# Patient Record
Sex: MALE | Race: WHITE | NOT HISPANIC OR LATINO | Employment: OTHER | URBAN - METROPOLITAN AREA
[De-identification: names, ages, dates, MRNs, and addresses within clinical notes are randomized per-mention and may not be internally consistent; named-entity substitution may affect disease eponyms.]

---

## 2019-10-22 ENCOUNTER — HOSPITAL ENCOUNTER (OUTPATIENT)
Dept: GENERAL RADIOLOGY | Facility: HOSPITAL | Age: 44
Discharge: HOME | End: 2019-10-22

## 2019-10-22 ENCOUNTER — CONSULT (OUTPATIENT)
Dept: BARIATRICS/WEIGHT MGMT | Facility: CLINIC | Age: 44
End: 2019-10-22

## 2019-10-22 ENCOUNTER — TRANSCRIBE ORDERS (OUTPATIENT)
Dept: CARDIOLOGY | Facility: HOSPITAL | Age: 44
End: 2019-10-22

## 2019-10-22 ENCOUNTER — HOSPITAL ENCOUNTER (OUTPATIENT)
Dept: CARDIOLOGY | Facility: HOSPITAL | Age: 44
Discharge: HOME | End: 2019-10-22
Admitting: NURSE PRACTITIONER

## 2019-10-22 ENCOUNTER — LAB (OUTPATIENT)
Dept: LAB | Facility: HOSPITAL | Age: 44
End: 2019-10-22

## 2019-10-22 VITALS
BODY MASS INDEX: 45.1 KG/M2 | DIASTOLIC BLOOD PRESSURE: 93 MMHG | WEIGHT: 315 LBS | RESPIRATION RATE: 18 BRPM | TEMPERATURE: 97.5 F | SYSTOLIC BLOOD PRESSURE: 152 MMHG | HEART RATE: 71 BPM | HEIGHT: 70 IN

## 2019-10-22 DIAGNOSIS — E03.9 HYPOTHYROIDISM, UNSPECIFIED TYPE: ICD-10-CM

## 2019-10-22 DIAGNOSIS — E66.01 CLASS 3 SEVERE OBESITY WITH SERIOUS COMORBIDITY AND BODY MASS INDEX (BMI) OF 45.0 TO 49.9 IN ADULT, UNSPECIFIED OBESITY TYPE (HCC): Primary | ICD-10-CM

## 2019-10-22 DIAGNOSIS — F31.9 BIPOLAR 1 DISORDER (HCC): ICD-10-CM

## 2019-10-22 DIAGNOSIS — R53.82 CHRONIC FATIGUE: ICD-10-CM

## 2019-10-22 DIAGNOSIS — E66.01 CLASS 3 SEVERE OBESITY WITH SERIOUS COMORBIDITY AND BODY MASS INDEX (BMI) OF 45.0 TO 49.9 IN ADULT, UNSPECIFIED OBESITY TYPE (HCC): ICD-10-CM

## 2019-10-22 DIAGNOSIS — E78.5 HYPERLIPIDEMIA, UNSPECIFIED HYPERLIPIDEMIA TYPE: ICD-10-CM

## 2019-10-22 DIAGNOSIS — Z01.811 PRE-OP CHEST EXAM: Primary | ICD-10-CM

## 2019-10-22 DIAGNOSIS — K21.9 GASTROESOPHAGEAL REFLUX DISEASE, ESOPHAGITIS PRESENCE NOT SPECIFIED: ICD-10-CM

## 2019-10-22 DIAGNOSIS — I10 ESSENTIAL HYPERTENSION: ICD-10-CM

## 2019-10-22 DIAGNOSIS — G47.33 OBSTRUCTIVE SLEEP APNEA SYNDROME: ICD-10-CM

## 2019-10-22 DIAGNOSIS — F41.8 DEPRESSION WITH ANXIETY: ICD-10-CM

## 2019-10-22 PROBLEM — E66.813 CLASS 3 SEVERE OBESITY WITH BODY MASS INDEX (BMI) OF 45.0 TO 49.9 IN ADULT: Status: ACTIVE | Noted: 2019-10-22

## 2019-10-22 PROBLEM — G47.30 SLEEP APNEA: Status: ACTIVE | Noted: 2019-10-22

## 2019-10-22 PROBLEM — G47.00 INSOMNIA: Status: ACTIVE | Noted: 2019-10-22

## 2019-10-22 LAB
ALBUMIN SERPL-MCNC: 4.4 G/DL (ref 3.5–5.2)
ALBUMIN/GLOB SERPL: 1.7 G/DL
ALP SERPL-CCNC: 78 U/L (ref 39–117)
ALT SERPL W P-5'-P-CCNC: 32 U/L (ref 1–41)
ANION GAP SERPL CALCULATED.3IONS-SCNC: 9.7 MMOL/L (ref 5–15)
AST SERPL-CCNC: 19 U/L (ref 1–40)
BASOPHILS # BLD AUTO: 0.06 10*3/MM3 (ref 0–0.2)
BASOPHILS NFR BLD AUTO: 1.1 % (ref 0–1.5)
BILIRUB SERPL-MCNC: 0.3 MG/DL (ref 0.2–1.2)
BUN BLD-MCNC: 10 MG/DL (ref 6–20)
BUN/CREAT SERPL: 11.4 (ref 7–25)
CALCIUM SPEC-SCNC: 9.1 MG/DL (ref 8.6–10.5)
CHLORIDE SERPL-SCNC: 107 MMOL/L (ref 98–107)
CHOLEST SERPL-MCNC: 133 MG/DL (ref 0–200)
CO2 SERPL-SCNC: 27.3 MMOL/L (ref 22–29)
CREAT BLD-MCNC: 0.88 MG/DL (ref 0.76–1.27)
DEPRECATED RDW RBC AUTO: 43.5 FL (ref 37–54)
EOSINOPHIL # BLD AUTO: 0.3 10*3/MM3 (ref 0–0.4)
EOSINOPHIL NFR BLD AUTO: 5.6 % (ref 0.3–6.2)
ERYTHROCYTE [DISTWIDTH] IN BLOOD BY AUTOMATED COUNT: 13.4 % (ref 12.3–15.4)
GFR SERPL CREATININE-BSD FRML MDRD: 95 ML/MIN/1.73
GLOBULIN UR ELPH-MCNC: 2.6 GM/DL
GLUCOSE BLD-MCNC: 91 MG/DL (ref 65–99)
HBA1C MFR BLD: 5.4 % (ref 4.8–5.6)
HCT VFR BLD AUTO: 41 % (ref 37.5–51)
HDLC SERPL-MCNC: 34 MG/DL (ref 40–60)
HGB BLD-MCNC: 13.9 G/DL (ref 13–17.7)
IMM GRANULOCYTES # BLD AUTO: 0.02 10*3/MM3 (ref 0–0.05)
IMM GRANULOCYTES NFR BLD AUTO: 0.4 % (ref 0–0.5)
LDLC SERPL CALC-MCNC: 50 MG/DL (ref 0–100)
LDLC/HDLC SERPL: 1.46 {RATIO}
LYMPHOCYTES # BLD AUTO: 1.91 10*3/MM3 (ref 0.7–3.1)
LYMPHOCYTES NFR BLD AUTO: 35.9 % (ref 19.6–45.3)
MCH RBC QN AUTO: 30 PG (ref 26.6–33)
MCHC RBC AUTO-ENTMCNC: 33.9 G/DL (ref 31.5–35.7)
MCV RBC AUTO: 88.4 FL (ref 79–97)
MONOCYTES # BLD AUTO: 0.46 10*3/MM3 (ref 0.1–0.9)
MONOCYTES NFR BLD AUTO: 8.6 % (ref 5–12)
NEUTROPHILS # BLD AUTO: 2.57 10*3/MM3 (ref 1.7–7)
NEUTROPHILS NFR BLD AUTO: 48.4 % (ref 42.7–76)
NRBC BLD AUTO-RTO: 0 /100 WBC (ref 0–0.2)
PLATELET # BLD AUTO: 209 10*3/MM3 (ref 140–450)
PMV BLD AUTO: 9.4 FL (ref 6–12)
POTASSIUM BLD-SCNC: 4.5 MMOL/L (ref 3.5–5.2)
PROT SERPL-MCNC: 7 G/DL (ref 6–8.5)
RBC # BLD AUTO: 4.64 10*6/MM3 (ref 4.14–5.8)
SODIUM BLD-SCNC: 144 MMOL/L (ref 136–145)
TRIGL SERPL-MCNC: 247 MG/DL (ref 0–150)
TSH SERPL DL<=0.05 MIU/L-ACNC: 0.78 UIU/ML (ref 0.27–4.2)
VLDLC SERPL-MCNC: 49.4 MG/DL (ref 5–40)
WBC NRBC COR # BLD: 5.32 10*3/MM3 (ref 3.4–10.8)

## 2019-10-22 PROCEDURE — 93010 ELECTROCARDIOGRAM REPORT: CPT | Performed by: INTERNAL MEDICINE

## 2019-10-22 PROCEDURE — 84443 ASSAY THYROID STIM HORMONE: CPT

## 2019-10-22 PROCEDURE — 85025 COMPLETE CBC W/AUTO DIFF WBC: CPT

## 2019-10-22 PROCEDURE — 93005 ELECTROCARDIOGRAM TRACING: CPT

## 2019-10-22 PROCEDURE — 36415 COLL VENOUS BLD VENIPUNCTURE: CPT

## 2019-10-22 PROCEDURE — 80053 COMPREHEN METABOLIC PANEL: CPT

## 2019-10-22 PROCEDURE — 71046 X-RAY EXAM CHEST 2 VIEWS: CPT

## 2019-10-22 PROCEDURE — 99205 OFFICE O/P NEW HI 60 MIN: CPT | Performed by: NURSE PRACTITIONER

## 2019-10-22 PROCEDURE — 83036 HEMOGLOBIN GLYCOSYLATED A1C: CPT

## 2019-10-22 PROCEDURE — 80061 LIPID PANEL: CPT

## 2019-10-22 RX ORDER — ARIPIPRAZOLE 30 MG/1
30 TABLET ORAL DAILY
COMMUNITY
Start: 2014-07-09 | End: 2022-11-04

## 2019-10-22 RX ORDER — CARBAMAZEPINE 200 MG/1
100 TABLET ORAL
COMMUNITY

## 2019-10-22 RX ORDER — ESCITALOPRAM OXALATE 20 MG/1
20 TABLET ORAL DAILY
COMMUNITY
End: 2022-11-04

## 2019-10-22 RX ORDER — HYDROCODONE BITARTRATE AND ACETAMINOPHEN 7.5; 325 MG/1; MG/1
1 TABLET ORAL
COMMUNITY
Start: 2019-08-28 | End: 2020-01-13

## 2019-10-22 RX ORDER — LOSARTAN POTASSIUM 50 MG/1
50 TABLET ORAL DAILY
COMMUNITY
End: 2022-11-04

## 2019-10-22 RX ORDER — OFLOXACIN 3 MG/ML
5 SOLUTION AURICULAR (OTIC)
COMMUNITY
Start: 2019-08-28 | End: 2019-10-22

## 2019-10-22 RX ORDER — PANTOPRAZOLE SODIUM 40 MG/1
40 GRANULE, DELAYED RELEASE ORAL DAILY
Status: ON HOLD | COMMUNITY
End: 2022-11-09

## 2019-10-22 RX ORDER — LEVOTHYROXINE SODIUM 0.05 MG/1
50 TABLET ORAL DAILY
COMMUNITY

## 2019-10-22 RX ORDER — MIRTAZAPINE 30 MG/1
30 TABLET, FILM COATED ORAL DAILY
COMMUNITY
End: 2022-11-04

## 2019-10-22 RX ORDER — OLANZAPINE 5 MG/1
5 TABLET ORAL DAILY
COMMUNITY
End: 2022-11-04

## 2019-10-22 RX ORDER — ATORVASTATIN CALCIUM 10 MG/1
10 TABLET, FILM COATED ORAL DAILY
COMMUNITY
End: 2022-11-04

## 2019-10-22 NOTE — PROGRESS NOTES
"Bariatric Nutrition Counseling Interview    Patient Name:  Vikash Mccarty  YOB: 1975  Age:  44 y.o.  Sex:  male  MRN: [id number]  Date:  10/22/19    Procedure Considering:  Sleeve    Last Documented Height:    Ht Readings from Last 1 Encounters:   10/22/19 177.7 cm (69.96\")     Last Documented Weight:   Wt Readings from Last 1 Encounters:   10/22/19 (!) 154 kg (338 lb 8 oz)      Body mass index is 48.62 kg/m².    Highest Weight:  350  Goal Weight: 200    History:  History reviewed. No pertinent past medical history.  Past Surgical History:   Procedure Laterality Date   • COLONOSCOPY  2019   • TYMPANOSTOMY TUBE PLACEMENT  2019     Family History   Problem Relation Age of Onset   • Hypertension Mother    • Stroke Mother    • Sleep apnea Mother    • Hypertension Father    • Heart attack Father    • Prostate cancer Father    • Hypertension Brother    • Sleep apnea Brother    • Obesity Maternal Grandmother    • Hypertension Maternal Grandmother    • Diabetes Maternal Grandmother    • Heart attack Maternal Grandmother    • Obesity Maternal Grandfather    • Hypertension Maternal Grandfather      Social History     Socioeconomic History   • Marital status:      Spouse name: Not on file   • Number of children: Not on file   • Years of education: Not on file   • Highest education level: Not on file   Tobacco Use   • Smoking status: Never Smoker   • Smokeless tobacco: Never Used   Substance and Sexual Activity   • Alcohol use: Yes     Frequency: 2-3 times a week     Drinks per session: 1 or 2     Binge frequency: Less than monthly   • Drug use: No   • Sexual activity: Defer     Additional Health Issues to Consider:  Depression, migraines, high cholesterol, HTN, hypothyroidism per patient report    Weight History:  Always been overweight    Previous Weight Loss Efforts:  Calorie counting  Most Successful Weight Loss Effort:  , diet modification, meal prep - lost 10-15lb    Eating " Habits: Always hungry, Eat in response to stress, Eat large portions, Eat out of boredom, Eat too fast, Late night eating, Snacking on high calorie foods  Eat three meals on most days?  No  Worst eating habit?  Eat out of boredom    How often do you eat fast food? 3-4 times weekly    Do you exercise regularly? (at least 3 times each week)  No    Occupation:  Disabled    Personal Goal After Procedure:  Be healthier and fit   Personal Support:  family and friends    Assessment:  Program materials for successful weight loss before/after bariatric surgery were provided, reviewed, and discussed. The significance of taking in at least 70g of protein and 64 ounces of fluid was emphasized. The importance of routine exercise was discussed. Nutrition materials provided included a reduced calorie meal plan, protein sources, snack options, and diet guidelines post-surgery. Discussed personal habits and lifestyle behaviors that may influence diet efforts. He demonstrated a good comprehension of diet requirements and a commitment to work on personal challenges.  Patient was also provided a list of short-term goals to work towards prior to surgery. He appears to be an appropriate candidate for bariatric surgery.    Electronically signed by:  Dionna Murray RD  10/22/19 11:31 AM

## 2019-10-22 NOTE — PROGRESS NOTES
[facility]  [address] [facility] [address]  [facility] [address]  [phone]  [address] [address]  [phone]  Dept: [phone]  10/22/2019      Vikash Mccarty.  [id number]  [id number]  1975  male      Chief Complaint of weight gain; unable to maintain weight loss    History of Present Illness:   Vikash is a 44 y.o. male who presents today for evaluation, education and consultation regarding weight loss surgery. The patient is interested in the sleeve gastrectomy.      Diet History:Vikash has been overweight for at least 20 years, has been 35 pounds or more overweight for at least 10 years, has been 100 pounds or more overweight for 10 or more years and started dieting at age 22.  The most weight Vikash lost was 20 pounds on [facility] program and maintained the weight loss for 1 month. Vikash describes his eating habits as snacking without portion control, drinking 3 cups of coffee and 1-2 sodas per day, using food to cope with loneliness, boredom, anxiety. Vikash Mccarty has tried reduced calorie and exercising among others with success of losing up to 20 pounds, but in each instance regained the weight.    See dietician documentation for complete history.    Bariatric Surgery Evaluation: The patient is being seen for an initial visit for bariatric surgery evaluation.     Bariatric Co-morbidities:  sleep apnea, hypertension, dyslipidemia, GERD, depression and mental health disease    Patient Active Problem List   Diagnosis   • Class 3 severe obesity with body mass index (BMI) of 45.0 to 49.9 in adult (CMS/HCC)   • Essential hypertension   • Chronic fatigue   • Insomnia   • Hyperlipidemia   • Sleep apnea   • GERD (gastroesophageal reflux disease)   • Depression with anxiety   • Bipolar 1 disorder (CMS/[facility])   • Hypothyroidism       No past medical history on file.    Past Surgical History:   Procedure  Laterality Date   • COLONOSCOPY  2019   • TYMPANOSTOMY TUBE PLACEMENT  2019       No Known Allergies      Current Outpatient Medications:   •  ARIPiprazole (ABILIFY) 30 MG tablet, ABILIFY TABLET, Disp: , Rfl:   •  atorvastatin (LIPITOR) 10 MG tablet, Take 10 mg by mouth Daily., Disp: , Rfl:   •  carBAMazepine (TEGretol) 200 MG tablet, Take 200 mg by mouth., Disp: , Rfl:   •  Erenumab-aooe (AIMOVIG) 70 MG/ML prefilled syringe, Inject 70 mg under the skin into the appropriate area as directed., Disp: , Rfl:   •  escitalopram (LEXAPRO) 20 MG tablet, Take 20 mg by mouth Daily., Disp: , Rfl:   •  HYDROcodone-acetaminophen (NORCO) 7.5-325 MG per tablet, Take 1 tablet by mouth., Disp: , Rfl:   •  levothyroxine (SYNTHROID, LEVOTHROID) 50 MCG tablet, Take 50 mcg by mouth Daily., Disp: , Rfl:   •  losartan (COZAAR) 50 MG tablet, Take 50 mg by mouth Daily., Disp: , Rfl:   •  mirtazapine (REMERON) 30 MG tablet, Take 30 mg by mouth Daily., Disp: , Rfl:   •  ofloxacin (FLOXIN) 0.3 % otic solution, Administer 5 drops into ear(s) as directed by provider., Disp: , Rfl:   •  OLANZapine (zyPREXA) 5 MG tablet, Take 5 mg by mouth Daily., Disp: , Rfl:   •  pantoprazole (PROTONIX) 40 MG pack packet, Take 40 mg by mouth Daily., Disp: , Rfl:     Social History     Socioeconomic History   • Marital status:      Spouse name: Not on file   • Number of children: Not on file   • Years of education: Not on file   • Highest education level: Not on file   Tobacco Use   • Smoking status: Never Smoker   • Smokeless tobacco: Never Used   Substance and Sexual Activity   • Alcohol use: Yes     Frequency: 2-3 times a week     Drinks per session: 1 or 2     Binge frequency: Less than monthly   • Drug use: No   • Sexual activity: Defer       Family History   Problem Relation Age of Onset   • Hypertension Mother    • Stroke Mother    • Sleep apnea Mother    • Hypertension Father    • Heart attack Father    • Prostate cancer Father    • Hypertension  Brother    • Sleep apnea Brother    • Obesity Maternal Grandmother    • Hypertension Maternal Grandmother    • Diabetes Maternal Grandmother    • Heart attack Maternal Grandmother    • Obesity Maternal Grandfather    • Hypertension Maternal Grandfather          Review of Systems:  Review of Systems   Constitutional: Positive for fatigue. Negative for unexpected weight change.   HENT: Negative.    Respiratory: Negative.    Cardiovascular: Negative.    Gastrointestinal: Negative for constipation.   Endocrine: Negative.    Genitourinary: Negative.    Musculoskeletal: Negative for back pain.   Neurological: Negative.    Hematological: Negative.    Psychiatric/Behavioral: Negative.        Physical Exam:  Vital Signs:  Weight: (!) 154 kg (338 lb 8 oz)   Body mass index is 48.62 kg/m².  Temp: 97.5 °F (36.4 °C)   Heart Rate: 71   BP: 152/93     Physical Exam   Constitutional: He is oriented to person, place, and time. He appears well-developed and well-nourished.   HENT:   Head: Normocephalic and atraumatic.   Eyes: Pupils are equal, round, and reactive to light.   Neck: Normal range of motion.   Cardiovascular: Normal rate and regular rhythm.   Pulmonary/Chest: Effort normal and breath sounds normal. No respiratory distress. He has no wheezes.   Abdominal: Soft. Bowel sounds are normal. He exhibits no distension. There is no tenderness.   Musculoskeletal: Normal range of motion. He exhibits no edema.   Neurological: He is alert and oriented to person, place, and time.   Skin: Skin is warm and dry.   Psychiatric: He has a normal mood and affect. His behavior is normal.   Nursing note and vitals reviewed.         Assessment:         Vikash Mccarty is a 44 y.o. year old male with medically complicated severe obesity. Weight: (!) 154 kg (338 lb 8 oz), Body mass index is 48.62 kg/m². and weight related problems including sleep apnea, hypertension, dyslipidemia, GERD, depression and mental health disease.    I explained in  detail the procedures that we are performing.  All of those procedures can be performed laparoscopically but there is a chance to convert to open if any technical challenges or complications do occur.  Bariatric surgery is not cosmetic surgery but rather a tool to help a patient make a life-long commitment lifestyle changes including diet, exercise, behavior changes, and taking supplemental vitamins and minerals.    Due to the patient's BMI and co-morbidities they are at a high risk for surgery and will obtain the following:  The patient has been advised that a letter of medical support and a history and physical must be obtained from his primary care physician. A psychological evaluation will be arranged for this patient. CBC, CMP, FLP, TSH and HgbA1C will be drawn. Vikash Mccarty will obtain a pre-operative CXR and EKG.       Vikash Mccarty will be set up for a pre-operative diagnostic esophagogastroduodenoscopy with biopsy for evaluation. The risks and benefits of the procedure were discussed with the patient in detail and all questions were answered.  Possibility of perforation, bleeding, aspiration, anoxic brain injury, respiratory and/or cardiac arrest and death were discussed.   He received handouts regarding, all questions were answered and informed consent was obtained.     The risks, benefits, alternatives, and potential complications of all of the procedures were explained in detail including, but not limited to death, anesthesia and medication adverse effect/DVT, pulmonary embolism, trocar site/incisional hernia, wound infection, abdominal infection, bleeding, failure to lose weight or gain weight and change in body image, metabolic complications with calcium, thiamine, vitamin B12, folate, iron, and anemia.    The patient was advised to start a high protein, low fat and low carbohydrate diet. The patient was given individualized information by our dietician along with general group information  and handouts.       The patient was given information regarding the SWAPAN educational video. SWAPNA is an internet based educational video which explains the surgical procedure and answers basic questions regarding the procedure. The patient was provided with instructions and a password to watch the video.    The patient was encouraged to start routine exercise including but not limited to 150 minutes per week. The patient received a resistance band along with a handout of exercises.     The consultation plan was reviewed with the patient.    The patient understands the surgical procedures and the different surgical options that are available.  He understands the lifestyle changes that would be required after surgery and has agreed to participate in a pre-operative and postoperative weight management program.  He also expressed understanding of possible risks, had several questions answered and desires to proceed.    I think he is a good candidate for this surgery, and is interested in a sleeve gastrectomy.    Encounter Diagnoses   Name Primary?   • Class 3 severe obesity with serious comorbidity and body mass index (BMI) of 45.0 to 49.9 in adult, unspecified obesity type (CMS/HCC) Yes   • Gastroesophageal reflux disease, esophagitis presence not specified    • Essential hypertension    • Hyperlipidemia, unspecified hyperlipidemia type    • Obstructive sleep apnea syndrome    • Hypothyroidism, unspecified type    • Chronic fatigue        Plan:    Patient will have evaluations and follow up with bariatric dieticians and a psychologist before undergoing a multidisciplinary review of his candidacy.  We also discussed the weight loss requirement and rationale, and other program requirements.      Ellen Mott, KEDAR  10/22/2019

## 2019-10-23 ENCOUNTER — TELEPHONE (OUTPATIENT)
Dept: BARIATRICS/WEIGHT MGMT | Facility: CLINIC | Age: 44
End: 2019-10-23

## 2019-10-23 PROBLEM — K21.9 GASTROESOPHAGEAL REFLUX DISEASE: Status: ACTIVE | Noted: 2019-10-23

## 2019-10-23 PROBLEM — E66.01 CLASS 3 SEVERE OBESITY WITH SERIOUS COMORBIDITY AND BODY MASS INDEX (BMI) OF 45.0 TO 49.9 IN ADULT: Status: ACTIVE | Noted: 2019-10-23

## 2019-10-23 PROBLEM — G47.33 OBSTRUCTIVE SLEEP APNEA SYNDROME: Status: ACTIVE | Noted: 2019-10-23

## 2019-10-23 PROBLEM — E66.813 CLASS 3 SEVERE OBESITY WITH SERIOUS COMORBIDITY AND BODY MASS INDEX (BMI) OF 45.0 TO 49.9 IN ADULT: Status: ACTIVE | Noted: 2019-10-23

## 2019-10-23 NOTE — TELEPHONE ENCOUNTER
Spoke with pt regarding EKG, Chest Xray and lab results. Labs sent to pt by mail. Informed pt to share with his pcp. Pt gave a verbal understanding.     ----- Message from KEDAR Aden sent at 10/22/2019  1:04 PM EDT -----  Please forward to pt's PCP. Thank you!  Floresita

## 2020-01-13 RX ORDER — PRAZOSIN HYDROCHLORIDE 1 MG/1
1 CAPSULE ORAL NIGHTLY
COMMUNITY

## 2020-01-14 ENCOUNTER — ANESTHESIA (OUTPATIENT)
Dept: GASTROENTEROLOGY | Facility: HOSPITAL | Age: 45
End: 2020-01-14

## 2020-01-14 ENCOUNTER — ANESTHESIA EVENT (OUTPATIENT)
Dept: GASTROENTEROLOGY | Facility: HOSPITAL | Age: 45
End: 2020-01-14

## 2020-01-14 ENCOUNTER — HOSPITAL ENCOUNTER (OUTPATIENT)
Facility: HOSPITAL | Age: 45
Setting detail: HOSPITAL OUTPATIENT SURGERY
Discharge: HOME | End: 2020-01-14
Attending: SURGERY | Admitting: SURGERY

## 2020-01-14 VITALS
BODY MASS INDEX: 45.1 KG/M2 | HEIGHT: 70 IN | OXYGEN SATURATION: 92 % | DIASTOLIC BLOOD PRESSURE: 97 MMHG | RESPIRATION RATE: 16 BRPM | WEIGHT: 315 LBS | SYSTOLIC BLOOD PRESSURE: 133 MMHG | HEART RATE: 78 BPM

## 2020-01-14 DIAGNOSIS — R53.82 CHRONIC FATIGUE: ICD-10-CM

## 2020-01-14 DIAGNOSIS — K21.9 GASTROESOPHAGEAL REFLUX DISEASE, ESOPHAGITIS PRESENCE NOT SPECIFIED: ICD-10-CM

## 2020-01-14 DIAGNOSIS — E66.01 CLASS 3 SEVERE OBESITY WITH SERIOUS COMORBIDITY AND BODY MASS INDEX (BMI) OF 45.0 TO 49.9 IN ADULT, UNSPECIFIED OBESITY TYPE (HCC): ICD-10-CM

## 2020-01-14 DIAGNOSIS — I10 ESSENTIAL HYPERTENSION: ICD-10-CM

## 2020-01-14 DIAGNOSIS — E03.9 HYPOTHYROIDISM, UNSPECIFIED TYPE: ICD-10-CM

## 2020-01-14 DIAGNOSIS — E78.5 HYPERLIPIDEMIA, UNSPECIFIED HYPERLIPIDEMIA TYPE: ICD-10-CM

## 2020-01-14 DIAGNOSIS — G47.33 OBSTRUCTIVE SLEEP APNEA SYNDROME: ICD-10-CM

## 2020-01-14 PROCEDURE — 43239 EGD BIOPSY SINGLE/MULTIPLE: CPT | Performed by: SURGERY

## 2020-01-14 PROCEDURE — 25010000002 PROPOFOL 10 MG/ML EMULSION: Performed by: ANESTHESIOLOGY

## 2020-01-14 PROCEDURE — 87081 CULTURE SCREEN ONLY: CPT | Performed by: SURGERY

## 2020-01-14 RX ORDER — SODIUM CHLORIDE, SODIUM LACTATE, POTASSIUM CHLORIDE, CALCIUM CHLORIDE 600; 310; 30; 20 MG/100ML; MG/100ML; MG/100ML; MG/100ML
1000 INJECTION, SOLUTION INTRAVENOUS CONTINUOUS
Status: DISCONTINUED | OUTPATIENT
Start: 2020-01-14 | End: 2020-01-14 | Stop reason: HOSPADM

## 2020-01-14 RX ORDER — PROPOFOL 10 MG/ML
VIAL (ML) INTRAVENOUS AS NEEDED
Status: DISCONTINUED | OUTPATIENT
Start: 2020-01-14 | End: 2020-01-14 | Stop reason: SURG

## 2020-01-14 RX ORDER — PROPOFOL 10 MG/ML
VIAL (ML) INTRAVENOUS CONTINUOUS PRN
Status: DISCONTINUED | OUTPATIENT
Start: 2020-01-14 | End: 2020-01-14 | Stop reason: SURG

## 2020-01-14 RX ORDER — LIDOCAINE HYDROCHLORIDE 20 MG/ML
INJECTION, SOLUTION INFILTRATION; PERINEURAL AS NEEDED
Status: DISCONTINUED | OUTPATIENT
Start: 2020-01-14 | End: 2020-01-14 | Stop reason: SURG

## 2020-01-14 RX ADMIN — LIDOCAINE HYDROCHLORIDE 60 MG: 20 INJECTION, SOLUTION INFILTRATION; PERINEURAL at 07:03

## 2020-01-14 RX ADMIN — PROPOFOL 50 MG: 10 INJECTION, EMULSION INTRAVENOUS at 07:03

## 2020-01-14 RX ADMIN — PROPOFOL 100 MCG/KG/MIN: 10 INJECTION, EMULSION INTRAVENOUS at 07:03

## 2020-01-14 RX ADMIN — SODIUM CHLORIDE, POTASSIUM CHLORIDE, SODIUM LACTATE AND CALCIUM CHLORIDE 1000 ML: 600; 310; 30; 20 INJECTION, SOLUTION INTRAVENOUS at 06:33

## 2020-01-14 NOTE — H&P
"  Patient Care Team:  Provider, No Known as PCP - General    Chief complaint Heartburn and in need of preoperative clearance prior to surgery    Subjective     Patient is a 44 y.o. male who is a patient of ours and has undergone our extensive initial evaluation for bariatric surgery and needs to proceed with upper endoscopy for preoperative clearance prior to proceeding with surgery.  Please see the initial history and physical for further detailed information.      Review of Systems   Pertinent items are noted in HPI and no changes since last visit.    Past Medical History:   Diagnosis Date   • Anxiety and depression    • Bipolar 1 disorder (CMS/HCC)    • Chest pain     \"NON CARDIAC\"   [facility]   • Disease of thyroid gland    • GERD (gastroesophageal reflux disease)    • Hyperlipidemia    • Hypertension    • Migraines    • Paranoid schizophrenia in remission (CMS/HCC)    • Sleep apnea     COMPLIANT WITH CPAP     Past Surgical History:   Procedure Laterality Date   • COLONOSCOPY  2019   • TYMPANOSTOMY TUBE PLACEMENT  2019     Family History   Problem Relation Age of Onset   • Hypertension Mother    • Stroke Mother    • Sleep apnea Mother    • Hypertension Father    • Heart attack Father    • Prostate cancer Father    • Hypertension Brother    • Sleep apnea Brother    • Obesity Maternal Grandmother    • Hypertension Maternal Grandmother    • Diabetes Maternal Grandmother    • Heart attack Maternal Grandmother    • Obesity Maternal Grandfather    • Hypertension Maternal Grandfather    • Malig Hyperthermia Neg Hx      Social History     Tobacco Use   • Smoking status: Never Smoker   • Smokeless tobacco: Never Used   Substance Use Topics   • Alcohol use: Yes     Frequency: 2-3 times a week     Drinks per session: 1 or 2     Binge frequency: Less than monthly     Comment: OCCASIONAL   • Drug use: No     Medications Prior to Admission   Medication Sig Dispense Refill Last Dose   • ARIPiprazole (ABILIFY) " "30 MG tablet ABILIFY TABLET   1/13/2020 at Unknown time   • atorvastatin (LIPITOR) 10 MG tablet Take 10 mg by mouth Daily.   Taking   • carBAMazepine (TEGretol) 200 MG tablet Take 200 mg by mouth Daily.   1/13/2020 at Unknown time   • Erenumab-aooe (AIMOVIG) 70 MG/ML prefilled syringe Inject 70 mg under the skin into the appropriate area as directed.   1/13/2020 at Unknown time   • escitalopram (LEXAPRO) 20 MG tablet Take 20 mg by mouth Daily.   1/13/2020 at Unknown time   • levothyroxine (SYNTHROID, LEVOTHROID) 50 MCG tablet Take 50 mcg by mouth Daily.   Taking   • losartan (COZAAR) 50 MG tablet Take 50 mg by mouth Daily.   1/14/2020 at Unknown time   • mirtazapine (REMERON) 30 MG tablet Take 30 mg by mouth Daily.   Taking   • OLANZapine (zyPREXA) 5 MG tablet Take 5 mg by mouth Daily.   Taking   • pantoprazole (PROTONIX) 40 MG pack packet Take 40 mg by mouth Daily.   1/14/2020 at Unknown time   • prazosin (MINIPRESS) 1 MG capsule Take 1 mg by mouth Daily.   1/13/2020 at Unknown time     Allergies:  Patient has no known allergies.    Vital Signs  Heart Rate:  [73] 73  Resp:  [15] 15  BP: (132)/(88) 132/88    Flowsheet Rows      First Filed Value   Admission Height  177.8 cm (70\") Documented at 01/14/2020 0616   Admission Weight  (!) 155 kg (342 lb 3.2 oz) Documented at 01/14/2020 0616           Physical Exam:   Heart: RR  Lungs: CTA B  Abd: soft and NT/ND  Ext: no clubbing, cyanosis    Results Review:    I have reviewed the patient's clinical results      Essential hypertension    Chronic fatigue    Hyperlipidemia    Hypothyroidism    Class 3 severe obesity with serious comorbidity and body mass index (BMI) of 45.0 to 49.9 in adult (CMS/[facility])    Gastroesophageal reflux disease    Obstructive sleep apnea syndrome      The risks and benefits of the procedure were discussed with the patient in detail and all questions were answered.  Possibility of perforation, bleeding, aspiration, anoxic brain injury, respiratory " and/or cardiac arrest and death were discussed.  Consent will be signed and witnessed..     Kenrick Zambrano MD  01/14/20  7:03 AM  Time: Approximately 15 minutes was spent with the patient and over half that time was spent counseling.  All of the patients questions were answered.

## 2020-01-14 NOTE — ANESTHESIA PREPROCEDURE EVALUATION
Anesthesia Evaluation     Patient summary reviewed and Nursing notes reviewed                Airway   Mallampati: III  TM distance: >3 FB  Neck ROM: full  Large neck circumference  Dental - normal exam     Pulmonary - normal exam   (+) sleep apnea,   Cardiovascular - normal exam    ECG reviewed    (+) hypertension, hyperlipidemia,       Neuro/Psych  (+) headaches, psychiatric history Bipolar and Schizophrenia,     GI/Hepatic/Renal/Endo    (+) obesity, morbid obesity, GERD,      Musculoskeletal (-) negative ROS    Abdominal  - normal exam    Bowel sounds: normal.   Substance History - negative use     OB/GYN negative ob/gyn ROS         Other                        Anesthesia Plan    ASA 3     MAC       Anesthetic plan, all risks, benefits, and alternatives have been provided, discussed and informed consent has been obtained with: patient.

## 2020-01-14 NOTE — ANESTHESIA POSTPROCEDURE EVALUATION
Patient: Vikash Mccarty    Procedure Summary     Date:  01/14/20 Room / Location:  [facility] ENDOSCOPY 7 /  [facility]    Anesthesia Start:  0700 Anesthesia Stop:  0717    Procedure:  ESOPHAGOGASTRODUODENOSCOPY WITH BIOPSY (N/A Esophagus) Diagnosis:       Class 3 severe obesity with serious comorbidity and body mass index (BMI) of 45.0 to 49.9 in adult, unspecified obesity type (CMS/HCC)      Gastroesophageal reflux disease, esophagitis presence not specified      Essential hypertension      Hyperlipidemia, unspecified hyperlipidemia type      Obstructive sleep apnea syndrome      Hypothyroidism, unspecified type      Chronic fatigue      (Class 3 severe obesity with serious comorbidity and body mass index (BMI) of 45.0 to 49.9 in adult, unspecified obesity type (CMS/HCC) [E66.01, Z68.42])      (Gastroesophageal reflux disease, esophagitis presence not specified [K21.9])      (Essential hypertension [I10])      (Hyperlipidemia, unspecified hyperlipidemia type [E78.5])      (Obstructive sleep apnea syndrome [G47.33])      (Hypothyroidism, unspecified type [E03.9])      (Chronic fatigue [R53.82])    Surgeon:  Kenrick Zambrano Jr., MD Provider:  Romel Kim MD    Anesthesia Type:  MAC ASA Status:  3          Anesthesia Type: MAC    Vitals  Vitals Value Taken Time   /97 1/14/2020  7:32 AM   Temp     Pulse 78 1/14/2020  7:32 AM   Resp 16 1/14/2020  7:32 AM   SpO2 92 % 1/14/2020  7:32 AM           Post Anesthesia Care and Evaluation    Patient location during evaluation: PACU  Patient participation: complete - patient participated  Level of consciousness: awake  Pain score: 0  Pain management: adequate  Airway patency: patent  Anesthetic complications: No anesthetic complications  PONV Status: none  Cardiovascular status: acceptable  Respiratory status: acceptable  Hydration status: acceptable    Comments: /97 (BP Location: Left arm, Patient Position: Lying)   Pulse 78   Resp 16   Ht 177.8 cm  "(70\")   Wt (!) 155 kg (342 lb 3.2 oz)   SpO2 92%   BMI 49.10 kg/m²       "

## 2020-01-14 NOTE — OP NOTE
Surgeon: Kenrick Zambrano Jr., M.D.    Preoperative Diagnosis: Gastroesophageal reflux disease    Postoperative Diagnosis: Gastritis    Procedure Performed: Transoral esophagogastroduodenoscopy with antral biopsies    Indications: 44-year-old gentleman with morbid obesity with complaints of heartburn.  Patient does take Protonix on a regular basis    Procedure:     The procedure, indications, preparation and potential complications were explained to the patient, who indicated understanding and signed the corresponding consent forms.  The patient was identified, taken to the endoscopy suite, and placed on the left side down decubitus position.  The patient underwent a MAC anesthesia and was appropriately monitored through the case by the anesthesia personnel with continuous pulse oximetry, blood pressure, and cardiac monitoring.  A bite block was placed.  After adequate IV sedation and using a transoral technique a lubed flexible endoscope was placed in the hypopharynx and advanced to the second portion of the duodenum without difficulty. The scope was then withdrawn back into the antrum of the stomach.  Cold forcep biopsies of the antrum were taken to rule out Helicobacter pylori.  The scope was retroflexed noting the body, fundus and cardia.  The scope was then withdrawn back into the esophagus after decompressing the stomach.  The Z line was noted and GE junction measured from the incisors.  The scope was then completely withdrawn.  The patient tolerated the procedure well and left the endoscopy suite in stable condition.  The findings are listed below.    Duodenum: Unremarkable  Antrum: Moderate patchy erythema  Body/Fundus: Unremarkable  Cardia: Unremarkable  Esophagus: Z line fairly regular, no erosive esophagitis; GE junction measured approximately 42 cm from the incisors    Recommendations:     Await biopsy results and follow-up in the office

## 2020-01-15 ENCOUNTER — TELEPHONE (OUTPATIENT)
Dept: BARIATRICS/WEIGHT MGMT | Facility: CLINIC | Age: 45
End: 2020-01-15

## 2020-01-15 LAB — UREASE TISS QL: NEGATIVE

## 2020-01-15 NOTE — TELEPHONE ENCOUNTER
Spoke with pt regarding scope result. Pt gave a verbal understanding.         ----- Message from Kenrick Zambrano Jr., MD sent at 1/15/2020  7:41 AM EST -----  Please call patient with negative results.

## 2020-02-21 ENCOUNTER — TELEPHONE (OUTPATIENT)
Dept: BARIATRICS/WEIGHT MGMT | Facility: CLINIC | Age: 45
End: 2020-02-21

## 2022-01-18 DIAGNOSIS — E66.01 OBESITY, CLASS III, BMI 40-49.9 (MORBID OBESITY): Primary | ICD-10-CM

## 2022-01-25 ENCOUNTER — HOSPITAL ENCOUNTER (OUTPATIENT)
Dept: CARDIOLOGY | Facility: HOSPITAL | Age: 47
Discharge: HOME | End: 2022-01-25

## 2022-01-25 ENCOUNTER — LAB (OUTPATIENT)
Dept: LAB | Facility: HOSPITAL | Age: 47
End: 2022-01-25

## 2022-01-25 ENCOUNTER — HOSPITAL ENCOUNTER (OUTPATIENT)
Dept: GENERAL RADIOLOGY | Facility: HOSPITAL | Age: 47
Discharge: HOME | End: 2022-01-25

## 2022-01-25 DIAGNOSIS — E66.01 OBESITY, CLASS III, BMI 40-49.9 (MORBID OBESITY): ICD-10-CM

## 2022-01-25 LAB
25(OH)D3 SERPL-MCNC: 14.6 NG/ML (ref 30–100)
ALBUMIN SERPL-MCNC: 4.9 G/DL (ref 3.5–5.2)
ALBUMIN/GLOB SERPL: 1.7 G/DL
ALP SERPL-CCNC: 101 U/L (ref 39–117)
ALT SERPL W P-5'-P-CCNC: 62 U/L (ref 1–41)
AMPHET+METHAMPHET UR QL: NEGATIVE
ANION GAP SERPL CALCULATED.3IONS-SCNC: 11 MMOL/L (ref 5–15)
AST SERPL-CCNC: 39 U/L (ref 1–40)
BARBITURATES UR QL SCN: NEGATIVE
BASOPHILS # BLD AUTO: 0.04 10*3/MM3 (ref 0–0.2)
BASOPHILS NFR BLD AUTO: 0.5 % (ref 0–1.5)
BENZODIAZ UR QL SCN: NEGATIVE
BILIRUB SERPL-MCNC: 0.3 MG/DL (ref 0–1.2)
BUN SERPL-MCNC: 13 MG/DL (ref 6–20)
BUN/CREAT SERPL: 10.2 (ref 7–25)
CALCIUM SPEC-SCNC: 9.7 MG/DL (ref 8.6–10.5)
CANNABINOIDS SERPL QL: NEGATIVE
CHLORIDE SERPL-SCNC: 100 MMOL/L (ref 98–107)
CHOLEST SERPL-MCNC: 195 MG/DL (ref 0–200)
CO2 SERPL-SCNC: 29 MMOL/L (ref 22–29)
COCAINE UR QL: NEGATIVE
CREAT SERPL-MCNC: 1.28 MG/DL (ref 0.76–1.27)
DEPRECATED RDW RBC AUTO: 45.6 FL (ref 37–54)
EOSINOPHIL # BLD AUTO: 0.36 10*3/MM3 (ref 0–0.4)
EOSINOPHIL NFR BLD AUTO: 4.5 % (ref 0.3–6.2)
ERYTHROCYTE [DISTWIDTH] IN BLOOD BY AUTOMATED COUNT: 13.3 % (ref 12.3–15.4)
GFR SERPL CREATININE-BSD FRML MDRD: 61 ML/MIN/1.73
GLOBULIN UR ELPH-MCNC: 2.9 GM/DL
GLUCOSE SERPL-MCNC: 104 MG/DL (ref 65–99)
HBA1C MFR BLD: 5.6 % (ref 3.5–5.6)
HCT VFR BLD AUTO: 49.4 % (ref 37.5–51)
HDLC SERPL-MCNC: 26 MG/DL (ref 40–60)
HGB BLD-MCNC: 16.3 G/DL (ref 13–17.7)
IMM GRANULOCYTES # BLD AUTO: 0.03 10*3/MM3 (ref 0–0.05)
IMM GRANULOCYTES NFR BLD AUTO: 0.4 % (ref 0–0.5)
LDLC SERPL CALC-MCNC: 102 MG/DL (ref 0–100)
LDLC/HDLC SERPL: 3.45 {RATIO}
LYMPHOCYTES # BLD AUTO: 2.77 10*3/MM3 (ref 0.7–3.1)
LYMPHOCYTES NFR BLD AUTO: 34.6 % (ref 19.6–45.3)
MCH RBC QN AUTO: 30.6 PG (ref 26.6–33)
MCHC RBC AUTO-ENTMCNC: 33 G/DL (ref 31.5–35.7)
MCV RBC AUTO: 92.7 FL (ref 79–97)
METHADONE UR QL SCN: NEGATIVE
MONOCYTES # BLD AUTO: 0.51 10*3/MM3 (ref 0.1–0.9)
MONOCYTES NFR BLD AUTO: 6.4 % (ref 5–12)
NEUTROPHILS NFR BLD AUTO: 4.29 10*3/MM3 (ref 1.7–7)
NEUTROPHILS NFR BLD AUTO: 53.6 % (ref 42.7–76)
NRBC BLD AUTO-RTO: 0 /100 WBC (ref 0–0.2)
OPIATES UR QL: NEGATIVE
OXYCODONE UR QL SCN: NEGATIVE
PLATELET # BLD AUTO: 259 10*3/MM3 (ref 140–450)
PMV BLD AUTO: 9.5 FL (ref 6–12)
POTASSIUM SERPL-SCNC: 4.8 MMOL/L (ref 3.5–5.2)
PROT SERPL-MCNC: 7.8 G/DL (ref 6–8.5)
RBC # BLD AUTO: 5.33 10*6/MM3 (ref 4.14–5.8)
SODIUM SERPL-SCNC: 140 MMOL/L (ref 136–145)
TRIGL SERPL-MCNC: 396 MG/DL (ref 0–150)
TSH SERPL DL<=0.05 MIU/L-ACNC: 1.75 UIU/ML (ref 0.27–4.2)
VLDLC SERPL-MCNC: 67 MG/DL (ref 5–40)
WBC NRBC COR # BLD: 8 10*3/MM3 (ref 3.4–10.8)

## 2022-01-25 PROCEDURE — 80307 DRUG TEST PRSMV CHEM ANLYZR: CPT

## 2022-01-25 PROCEDURE — 85025 COMPLETE CBC W/AUTO DIFF WBC: CPT

## 2022-01-25 PROCEDURE — 36415 COLL VENOUS BLD VENIPUNCTURE: CPT

## 2022-01-25 PROCEDURE — 83036 HEMOGLOBIN GLYCOSYLATED A1C: CPT

## 2022-01-25 PROCEDURE — 80061 LIPID PANEL: CPT

## 2022-01-25 PROCEDURE — 93005 ELECTROCARDIOGRAM TRACING: CPT | Performed by: NURSE PRACTITIONER

## 2022-01-25 PROCEDURE — 80305 DRUG TEST PRSMV DIR OPT OBS: CPT

## 2022-01-25 PROCEDURE — 71046 X-RAY EXAM CHEST 2 VIEWS: CPT

## 2022-01-25 PROCEDURE — 93010 ELECTROCARDIOGRAM REPORT: CPT | Performed by: INTERNAL MEDICINE

## 2022-01-25 PROCEDURE — 84425 ASSAY OF VITAMIN B-1: CPT

## 2022-01-25 PROCEDURE — 84443 ASSAY THYROID STIM HORMONE: CPT

## 2022-01-25 PROCEDURE — 82306 VITAMIN D 25 HYDROXY: CPT

## 2022-01-25 PROCEDURE — 80053 COMPREHEN METABOLIC PANEL: CPT

## 2022-01-25 RX ORDER — ERGOCALCIFEROL 1.25 MG/1
50000 CAPSULE ORAL
Qty: 12 CAPSULE | Refills: 1 | Status: CANCELLED | OUTPATIENT
Start: 2022-01-25

## 2022-01-26 LAB — QT INTERVAL: 408 MS

## 2022-01-27 ENCOUNTER — CONSULT (OUTPATIENT)
Dept: BARIATRICS/WEIGHT MGMT | Facility: CLINIC | Age: 47
End: 2022-01-27

## 2022-01-27 VITALS
WEIGHT: 315 LBS | RESPIRATION RATE: 18 BRPM | HEART RATE: 70 BPM | DIASTOLIC BLOOD PRESSURE: 96 MMHG | BODY MASS INDEX: 46.65 KG/M2 | SYSTOLIC BLOOD PRESSURE: 139 MMHG | HEIGHT: 69 IN | OXYGEN SATURATION: 98 % | TEMPERATURE: 98.1 F

## 2022-01-27 DIAGNOSIS — Z71.3 NUTRITIONAL COUNSELING: ICD-10-CM

## 2022-01-27 DIAGNOSIS — E66.01 OBESITY, CLASS III, BMI 40-49.9 (MORBID OBESITY): Primary | ICD-10-CM

## 2022-01-27 DIAGNOSIS — R94.31 ABNORMAL EKG: ICD-10-CM

## 2022-01-27 DIAGNOSIS — Z01.818 PRE-OP EXAM: ICD-10-CM

## 2022-01-27 LAB
COTININE UR QL SCN: NEGATIVE NG/ML
Lab: NORMAL

## 2022-01-27 PROCEDURE — 99215 OFFICE O/P EST HI 40 MIN: CPT | Performed by: NURSE PRACTITIONER

## 2022-01-27 RX ORDER — ERGOCALCIFEROL 1.25 MG/1
50000 CAPSULE ORAL
Qty: 12 CAPSULE | Refills: 1 | Status: SHIPPED | OUTPATIENT
Start: 2022-01-27 | End: 2022-11-04

## 2022-01-27 NOTE — PROGRESS NOTES
"[facility] BARIATRIC SURGERY  [address] [place] [address]  [address] [place] [address]  Dept: [phone]  1/27/2022      Vikash Mccaryt.  [id number]  [id number]  1975  male      Chief Complaint of weight gain; unable to maintain weight loss    History of Present Illness:   Vikash is a 46 y.o. male who presents today for evaluation, education and consultation regarding weight loss surgery. The patient is interested in the sleeve gastrectomy.      Diet History:See dietician/RN/MA documentation for complete history of weight and diet.     Bariatric Surgery Evaluation: The patient is being seen for an initial visit for bariatric surgery evaluation.     [facility] - 90 day program, and 6 month nutritional consultation    Breakfast: none   Lunch: none  Dinner: veggies , meat ( varies a lot)   Snacks: sweet tarts, cheetos, chip   Drinks: sweet tea 2 glasses a day , water, orange juice , minimal carbonation   Exercise: limited due to back pain        Patient Active Problem List   Diagnosis   • Class 3 severe obesity with body mass index (BMI) of 45.0 to 49.9 in adult ([facility])   • Essential hypertension   • Chronic fatigue   • Insomnia   • Hyperlipidemia   • Sleep apnea   • GERD (gastroesophageal reflux disease)   • Depression with anxiety   • Bipolar 1 disorder (HCC)   • Hypothyroidism   • Class 3 severe obesity with serious comorbidity and body mass index (BMI) of 45.0 to 49.9 in adult ([facility])   • Gastroesophageal reflux disease   • Obstructive sleep apnea syndrome   no CPAP or Bipap - noncompliant- has but doesn't wear    Past Medical History:   Diagnosis Date   • Anxiety and depression    • Bipolar 1 disorder (HCC)    • Chest pain     \"NON CARDIAC\"   [facility]   • Disease of thyroid gland    • GERD (gastroesophageal reflux disease)    • Hyperlipidemia    • Hypertension    • Migraines    • " Paranoid schizophrenia in remission (HCC)    • Sleep apnea     COMPLIANT WITH CPAP   trouble with anesthesia - waking up and going to sleep    Past Surgical History:   Procedure Laterality Date   • COLONOSCOPY  2019   • ENDOSCOPY N/A 1/14/2020    Procedure: ESOPHAGOGASTRODUODENOSCOPY WITH BIOPSY;  Surgeon: Kenrick Zambrano Jr., MD;  Location: [facility] ENDOSCOPY;  Service: General   • TYMPANOSTOMY TUBE PLACEMENT  2019   eye surgery as a child  Ear surgery of 90 % ear drum as a child- rt ear     No Known Allergies        Current Outpatient Medications:   •  ARIPiprazole (ABILIFY) 30 MG tablet, Take 30 mg by mouth Daily., Disp: , Rfl:   •  atorvastatin (LIPITOR) 10 MG tablet, Take 10 mg by mouth Daily., Disp: , Rfl:   •  carBAMazepine (TEGretol) 200 MG tablet, Take 200 mg by mouth Daily., Disp: , Rfl:   •  escitalopram (LEXAPRO) 20 MG tablet, Take 20 mg by mouth Daily., Disp: , Rfl:   •  levothyroxine (SYNTHROID, LEVOTHROID) 50 MCG tablet, Take 50 mcg by mouth Daily., Disp: , Rfl:   •  losartan (COZAAR) 50 MG tablet, Take 50 mg by mouth Daily., Disp: , Rfl:   •  mirtazapine (REMERON) 30 MG tablet, Take 30 mg by mouth Daily., Disp: , Rfl:   •  OLANZapine (zyPREXA) 5 MG tablet, Take 5 mg by mouth Daily., Disp: , Rfl:   •  pantoprazole (PROTONIX) 40 MG pack packet, Take 40 mg by mouth Daily., Disp: , Rfl:   •  prazosin (MINIPRESS) 1 MG capsule, Take 1 mg by mouth Daily., Disp: , Rfl:   •  vitamin D (ERGOCALCIFEROL) 1.25 MG (52180 UT) capsule capsule, Take 1 capsule by mouth Every 7 (Seven) Days., Disp: 12 capsule, Rfl: 1    Social History     Socioeconomic History   • Marital status:    Tobacco Use   • Smoking status: Never Smoker   • Smokeless tobacco: Never Used   Substance and Sexual Activity   • Alcohol use: Yes     Comment: OCCASIONAL   • Drug use: No   • Sexual activity: Defer       Family History   Problem Relation Age of Onset   • Hypertension Mother    • Stroke Mother    • Sleep apnea Mother    •  Hypertension Father    • Heart attack Father 30   • Prostate cancer Father    • Heart disease Father          age 63   • Hypertension Brother    • Sleep apnea Brother    • Obesity Brother    • Heart attack Brother 30   • Obesity Maternal Grandmother    • Hypertension Maternal Grandmother    • Diabetes Maternal Grandmother    • Heart attack Maternal Grandmother    • Obesity Maternal Grandfather    • Hypertension Maternal Grandfather    • Heart attack Maternal Grandfather    • No Known Problems Sister    • Hypertension Paternal Grandmother    • Heart attack Paternal Grandmother    • Heart disease Paternal Grandmother    • Hypertension Paternal Grandfather    • Heart attack Paternal Grandfather    • Heart disease Paternal Grandfather    • Malig Hyperthermia Neg Hx          Review of Systems:  Review of Systems   Constitutional:        Night sweats, weight gain, insomnia, fatigue    HENT:        Vision problems, hearing problems    Respiratory:        CPAP,sleep apnea, snoring   Cardiovascular:        HTN, shortness of breath on exertion, HLD   Gastrointestinal:        GERD, hemorrhoids, Fissure/ polyps   Endocrine:        Hypothyroidism    Genitourinary: Negative.    Musculoskeletal:        Broken bones, myalgia, back pain   Skin: Negative.    Neurological: Positive for dizziness.   Hematological: Negative.    Psychiatric/Behavioral:        Depression and anxiety, bipolar disorder, alcoholism/ substance abuse, currently taking medications for depression or psychiatric problems, seen a psychiatrist/ counselor, been hospitalized for psychiatric problems, attempted suicide        Physical Exam:  Vital Signs:  Weight: (!) 144 kg (316 lb 12.8 oz)   Body mass index is 46.45 kg/m².  Temp: 98.1 °F (36.7 °C)   Heart Rate: 70   BP: 139/96     Physical Exam  Constitutional:       Appearance: Normal appearance. He is obese.   Cardiovascular:      Pulses: Normal pulses.   Pulmonary:      Effort: Pulmonary effort is normal.       Breath sounds: Normal breath sounds.   Abdominal:      General: Abdomen is flat. Bowel sounds are normal.      Palpations: Abdomen is soft.   Skin:     General: Skin is warm and dry.   Neurological:      General: No focal deficit present.      Mental Status: He is alert and oriented to person, place, and time.   Psychiatric:         Mood and Affect: Mood normal.         Behavior: Behavior normal.         Thought Content: Thought content normal.         Judgment: Judgment normal.            Assessment:         Vikash Mccarty is a 46 y.o. year old male with medically complicated severe obesity. Weight: (!) 144 kg (316 lb 12.8 oz), Body mass index is 46.45 kg/m². and weight related problems.    I explained in detail the procedures that we are performing.  All of those procedures can be performed laparoscopically but there is a chance to convert to open if any technical challenges or complications do occur.  Bariatric surgery is not cosmetic surgery but rather a tool to help a patient make a life-long commitment lifestyle changes including diet, exercise, behavior changes, and taking supplemental vitamins and minerals.    Due to the patient's BMI and co-morbidities they are at a high risk for surgery and will obtain the following:  The patient has been advised that a letter of medical support and a history and physical must be obtained from his primary care physician. A psychological evaluation will be arranged for this patient. CBC, CMP, FLP, TSH and HgbA1C will be drawn. Vikash Mccarty will obtain a pre-operative CXR and EKG. Vikash Mccarty will obtain clearance from a cardiologist prior to surgery.     Vikash Mccarty will be set up for a pre-operative diagnostic esophagogastroduodenoscopy with biopsy for evaluation. The risks and benefits of the procedure were discussed with the patient in detail and all questions were answered.  Possibility of perforation, bleeding, aspiration, anoxic brain  injury, respiratory and/or cardiac arrest and death were discussed.   He received handouts regarding, all questions were answered and informed consent was obtained.     The risks, benefits, alternatives, and potential complications of all of the procedures were explained in detail including, but not limited to death, anesthesia and medication adverse effect/DVT, pulmonary embolism, trocar site/incisional hernia, wound infection, abdominal infection, bleeding, failure to lose weight or gain weight and change in body image, metabolic complications with calcium, thiamine, vitamin B12, folate, iron, and anemia.    The patient was advised to start a high protein, low fat and low carbohydrate diet. The patient was given individualized information by our dietician along with general group information and handouts.       The patient was encouraged to start routine exercise including but not limited to 150 minutes per week. The patient received a resistance band along with a handout of exercises.     The consultation plan was reviewed with the patient.    The patient understands the surgical procedures and the different surgical options that are available.  He understands the lifestyle changes that would be required after surgery and has agreed to participate in a pre-operative and postoperative weight management program.  He also expressed understanding of possible risks, had several questions answered and desires to proceed.    I think he is a good candidate for this surgery, and is interested in a sleeve gastrectomy.    Encounter Diagnoses   Name Primary?   • Obesity, Class III, BMI 40-49.9 (morbid obesity) (HCC) Yes   • Abnormal EKG        Plan:    Patient will have evaluations and follow up with bariatric dieticians and a psychologist before undergoing a multidisciplinary review of his candidacy.  We also discussed the weight loss requirement and rationale, and other program requirements.    [facility]. He has done the majority of the process with the [facility] including several months of SWL and a psychiatric evaluation. Pt did start the process 2 years ago with Dr. Zambrano, but failed his psych evaluation with [facility]. He even underwent an EGD with Dr. Zambrano. He has since been cleared by the [facility] psychiatrist. Pt will need cardiac clearance prior to surgery.     Plan to follow up for pre op. Plan to send off for insurance approval once cardiac clearance obtained.     Total time spent with patient 60 minutes of which 45 minutes were spent on education.       Agueda Tuttle, APRN  1/27/2022

## 2022-01-28 NOTE — TELEPHONE ENCOUNTER
Addended by: JOE SLATER on: 1/28/2022 09:38 AM     Modules accepted: Orders     Contacted Mr. Mccarty to discuss his recommendations/conclusions from his Psychological Evaluation from Dr. Ceci Kaufman, Ph.D, ABPP [facility] [employer].  I asked if he was aware of the results, and he first said no.  I read to him the conclusions from the evaluation.  He said she had discussed that but I didn't think I would have to wait a year.  I read to him what the requirements would be to be re-considered for surgery from a psychological standpoint.  He voiced an understanding.  I told him that the evaluation would be [facility] with [facility].  He stated that would be OK.  He stated that he will further [facility] through the [facility] in [place].    Mr. Mccarty was aware that Rhiannon Sanon from Dr. Zambrano's office was present and listened during the conversation.    Maddi Cohn, HANGN RNC, CBN-Bariatric Program Coordinator for [facility].

## 2022-02-02 ENCOUNTER — PATIENT ROUNDING (BHMG ONLY) (OUTPATIENT)
Dept: BARIATRICS/WEIGHT MGMT | Facility: CLINIC | Age: 47
End: 2022-02-02

## 2022-02-02 LAB — VIT B1 BLD-SCNC: 154.8 NMOL/L (ref 66.5–200)

## 2022-02-02 NOTE — PROGRESS NOTES
February 2, 2022    Hello, may I speak with Vikash Mccarty?    My name is KIRSTY    I am  with [facility] BARIATRIC SURGERY  [address] [place] [address].    Before we get started may I verify your date of birth? 1975    I am calling to officially welcome you to our practice and ask about your recent visit. Is this a good time to talk? yes    Tell me about your visit with us. What things went well?  Everything was great, you all are excellent over there.       We're always looking for ways to make our patients' experiences even better. Do you have recommendations on ways we may improve?  no    Overall were you satisfied with your first visit to our practice? yes       I appreciate you taking the time to speak with me today. Is there anything else I can do for you? no      Thank you, and have a great day.

## 2022-02-21 ENCOUNTER — TELEPHONE (OUTPATIENT)
Dept: CARDIOLOGY | Facility: CLINIC | Age: 47
End: 2022-02-21

## 2022-02-21 NOTE — TELEPHONE ENCOUNTER
Pt came to his appt for a cardiac clearance , bariatric but due to his insurance we were unable to see him. appt canceled.     I let REMI Alex with Dr Santos know

## 2022-02-28 ENCOUNTER — TELEPHONE (OUTPATIENT)
Dept: BARIATRICS/WEIGHT MGMT | Facility: CLINIC | Age: 47
End: 2022-02-28

## 2022-02-28 NOTE — TELEPHONE ENCOUNTER
Called Vikash to let him that he is clear to [facility] for the [facility] referral. He was going to reach out to SHERINE to get back on the schedule

## 2022-02-28 NOTE — TELEPHONE ENCOUNTER
Called [facility] care to update referral for Cardio, Spoke to Marcella and she stated that the referral for Bariatric surgery does include any other Drs needed for clearance and includes lab work, EKG, CXR and ECHO.

## 2022-03-02 ENCOUNTER — TELEPHONE (OUTPATIENT)
Dept: BARIATRICS/WEIGHT MGMT | Facility: CLINIC | Age: 47
End: 2022-03-02

## 2022-09-20 ENCOUNTER — TELEPHONE (OUTPATIENT)
Dept: BARIATRICS/WEIGHT MGMT | Facility: CLINIC | Age: 47
End: 2022-09-20

## 2022-11-03 ENCOUNTER — PREP FOR SURGERY (OUTPATIENT)
Dept: OTHER | Facility: HOSPITAL | Age: 47
End: 2022-11-03

## 2022-11-03 DIAGNOSIS — E66.01 OBESITY, CLASS III, BMI 40-49.9 (MORBID OBESITY): Primary | ICD-10-CM

## 2022-11-03 PROBLEM — E66.813 OBESITY, CLASS III, BMI 40-49.9 (MORBID OBESITY): Status: ACTIVE | Noted: 2022-11-03

## 2022-11-03 RX ORDER — SODIUM CHLORIDE, SODIUM LACTATE, POTASSIUM CHLORIDE, CALCIUM CHLORIDE 600; 310; 30; 20 MG/100ML; MG/100ML; MG/100ML; MG/100ML
100 INJECTION, SOLUTION INTRAVENOUS CONTINUOUS
Status: CANCELLED | OUTPATIENT
Start: 2022-11-03

## 2022-11-03 RX ORDER — CHLORHEXIDINE GLUCONATE 0.12 MG/ML
15 RINSE ORAL SEE ADMIN INSTRUCTIONS
Status: CANCELLED | OUTPATIENT
Start: 2022-11-03

## 2022-11-03 RX ORDER — SODIUM CHLORIDE 0.9 % (FLUSH) 0.9 %
3-10 SYRINGE (ML) INJECTION AS NEEDED
Status: CANCELLED | OUTPATIENT
Start: 2022-11-03

## 2022-11-03 RX ORDER — METOCLOPRAMIDE HYDROCHLORIDE 5 MG/ML
10 INJECTION INTRAMUSCULAR; INTRAVENOUS ONCE
Status: CANCELLED | OUTPATIENT
Start: 2022-11-03 | End: 2022-11-03

## 2022-11-03 RX ORDER — SODIUM CHLORIDE 0.9 % (FLUSH) 0.9 %
3 SYRINGE (ML) INJECTION EVERY 12 HOURS SCHEDULED
Status: CANCELLED | OUTPATIENT
Start: 2022-11-03

## 2022-11-03 RX ORDER — PANTOPRAZOLE SODIUM 40 MG/10ML
40 INJECTION, POWDER, LYOPHILIZED, FOR SOLUTION INTRAVENOUS ONCE
Status: CANCELLED | OUTPATIENT
Start: 2022-11-03 | End: 2022-11-03

## 2022-11-03 RX ORDER — SCOLOPAMINE TRANSDERMAL SYSTEM 1 MG/1
1 PATCH, EXTENDED RELEASE TRANSDERMAL ONCE
Status: CANCELLED | OUTPATIENT
Start: 2022-11-03 | End: 2022-11-03

## 2022-11-04 ENCOUNTER — HOSPITAL ENCOUNTER (OUTPATIENT)
Dept: CARDIOLOGY | Facility: HOSPITAL | Age: 47
Discharge: HOME | End: 2022-11-04

## 2022-11-04 ENCOUNTER — LAB (OUTPATIENT)
Dept: LAB | Facility: HOSPITAL | Age: 47
End: 2022-11-04

## 2022-11-04 ENCOUNTER — TELEPHONE (OUTPATIENT)
Dept: BARIATRICS/WEIGHT MGMT | Facility: CLINIC | Age: 47
End: 2022-11-04

## 2022-11-04 ENCOUNTER — CONSULT (OUTPATIENT)
Dept: BARIATRICS/WEIGHT MGMT | Facility: CLINIC | Age: 47
End: 2022-11-04

## 2022-11-04 VITALS
WEIGHT: 298.4 LBS | BODY MASS INDEX: 44.2 KG/M2 | HEIGHT: 69 IN | OXYGEN SATURATION: 98 % | SYSTOLIC BLOOD PRESSURE: 120 MMHG | HEART RATE: 61 BPM | DIASTOLIC BLOOD PRESSURE: 82 MMHG | RESPIRATION RATE: 14 BRPM

## 2022-11-04 DIAGNOSIS — E66.01 OBESITY, CLASS III, BMI 40-49.9 (MORBID OBESITY): ICD-10-CM

## 2022-11-04 DIAGNOSIS — E66.01 OBESITY, CLASS III, BMI 40-49.9 (MORBID OBESITY): Primary | ICD-10-CM

## 2022-11-04 LAB
ABO GROUP BLD: NORMAL
ANION GAP SERPL CALCULATED.3IONS-SCNC: 10.6 MMOL/L (ref 5–15)
BLD GP AB SCN SERPL QL: NEGATIVE
BUN SERPL-MCNC: 13 MG/DL (ref 6–20)
BUN/CREAT SERPL: 9 (ref 7–25)
CALCIUM SPEC-SCNC: 9.4 MG/DL (ref 8.6–10.5)
CHLORIDE SERPL-SCNC: 98 MMOL/L (ref 98–107)
CO2 SERPL-SCNC: 31.4 MMOL/L (ref 22–29)
CREAT SERPL-MCNC: 1.45 MG/DL (ref 0.76–1.27)
DEPRECATED RDW RBC AUTO: 40.5 FL (ref 37–54)
EGFRCR SERPLBLD CKD-EPI 2021: 60.6 ML/MIN/1.73
ERYTHROCYTE [DISTWIDTH] IN BLOOD BY AUTOMATED COUNT: 13.1 % (ref 12.3–15.4)
GLUCOSE SERPL-MCNC: 97 MG/DL (ref 65–99)
HCT VFR BLD AUTO: 42 % (ref 37.5–51)
HGB BLD-MCNC: 14.4 G/DL (ref 13–17.7)
MCH RBC QN AUTO: 29.8 PG (ref 26.6–33)
MCHC RBC AUTO-ENTMCNC: 34.3 G/DL (ref 31.5–35.7)
MCV RBC AUTO: 86.8 FL (ref 79–97)
PLATELET # BLD AUTO: 212 10*3/MM3 (ref 140–450)
PMV BLD AUTO: 9.3 FL (ref 6–12)
POTASSIUM SERPL-SCNC: 3.8 MMOL/L (ref 3.5–5.2)
RBC # BLD AUTO: 4.84 10*6/MM3 (ref 4.14–5.8)
RH BLD: POSITIVE
SODIUM SERPL-SCNC: 140 MMOL/L (ref 136–145)
T&S EXPIRATION DATE: NORMAL
WBC NRBC COR # BLD: 7.53 10*3/MM3 (ref 3.4–10.8)

## 2022-11-04 PROCEDURE — 80048 BASIC METABOLIC PNL TOTAL CA: CPT

## 2022-11-04 PROCEDURE — 99215 OFFICE O/P EST HI 40 MIN: CPT | Performed by: SURGERY

## 2022-11-04 PROCEDURE — 86850 RBC ANTIBODY SCREEN: CPT

## 2022-11-04 PROCEDURE — 85027 COMPLETE CBC AUTOMATED: CPT

## 2022-11-04 PROCEDURE — 86900 BLOOD TYPING SEROLOGIC ABO: CPT

## 2022-11-04 PROCEDURE — 36415 COLL VENOUS BLD VENIPUNCTURE: CPT

## 2022-11-04 PROCEDURE — 86901 BLOOD TYPING SEROLOGIC RH(D): CPT

## 2022-11-04 PROCEDURE — 93010 ELECTROCARDIOGRAM REPORT: CPT | Performed by: INTERNAL MEDICINE

## 2022-11-04 PROCEDURE — 93005 ELECTROCARDIOGRAM TRACING: CPT | Performed by: SURGERY

## 2022-11-04 RX ORDER — METOPROLOL TARTRATE 100 MG/1
50 TABLET ORAL 2 TIMES DAILY
COMMUNITY
End: 2023-02-13 | Stop reason: HOSPADM

## 2022-11-04 RX ORDER — EZETIMIBE 10 MG/1
10 TABLET ORAL
COMMUNITY

## 2022-11-04 RX ORDER — PANTOPRAZOLE SODIUM 40 MG/1
40 TABLET, DELAYED RELEASE ORAL 2 TIMES DAILY
COMMUNITY

## 2022-11-04 RX ORDER — FLUOXETINE HYDROCHLORIDE 20 MG/1
80 CAPSULE ORAL
COMMUNITY

## 2022-11-04 RX ORDER — HYDROXYZINE 50 MG/1
50 TABLET, FILM COATED ORAL
COMMUNITY

## 2022-11-04 RX ORDER — CARBAMAZEPINE 200 MG/1
200 TABLET ORAL
COMMUNITY

## 2022-11-04 RX ORDER — BUPROPION HYDROCHLORIDE 100 MG/1
100 TABLET ORAL
COMMUNITY

## 2022-11-04 RX ORDER — ASPIRIN 81 MG/1
81 TABLET, CHEWABLE ORAL DAILY
COMMUNITY
End: 2023-02-13 | Stop reason: HOSPADM

## 2022-11-04 RX ORDER — ATORVASTATIN CALCIUM 80 MG/1
80 TABLET, FILM COATED ORAL NIGHTLY
COMMUNITY

## 2022-11-04 NOTE — H&P (VIEW-ONLY)
"Bariatric Consult:    Chief Complaint: Morbid Obesity  Referred by Romel Cooper MD    Vikash Mccarty is here today for consult on Morbid Obesity    History of Present Illness:     Vikash Mccarty is a 47 y.o. male with morbid obesity with co-morbidities including  has a past medical history of Anxiety and depression, Bipolar 1 disorder (HCC), Chest pain, Disease of thyroid gland, GERD (gastroesophageal reflux disease), Hyperlipidemia, Hypertension, Migraines, Paranoid schizophrenia in remission (HCC), and Sleep apnea.  who presents for surgical consultation for the above procedure. Vikash has completed the initial intake visit and has been examined by our nurse practitioner, dietician, psychologist and underwent the extensive educational teaching process under the guidance of our bariatric coordinator and myself. Vikash also has seen the educational video SWAPNA on the surgical procedure if available. Vikash attended today more educational teaching from our bariatric coordinator and myself. Vikash has had an extensive medical workup including a visit with their primary care physician, EKG, chest radiograph, blood work, EGD or UGI and possibly further testing. These have been reviewed by me and discussed with the patient. Vikash is now ready to proceed with surgery. Vikash presently denies nausea, vomiting, fever, chills, chest pain, shortness of air, melena, hematochezia, hemetemesis, dysuria, frequency, hematuria, jaundice or abdominal pain.     Wt Readings from Last 10 Encounters:   11/04/22 135 kg (298 lb 6.4 oz)   01/27/22 (!) 144 kg (316 lb 12.8 oz)   01/14/20 (!) 155 kg (342 lb 3.2 oz)   10/22/19 (!) 154 kg (338 lb 8 oz)       The  pre-op EGD shows   normal    Past Medical History:   Diagnosis Date   • Anxiety and depression    • Bipolar 1 disorder (HCC)    • Chest pain     \"NON CARDIAC\"   [facility]   • Disease of thyroid gland    • GERD (gastroesophageal reflux disease)    • " Hyperlipidemia    • Hypertension    • Migraines    • Paranoid schizophrenia in remission ([facility])    • Sleep apnea     COMPLIANT WITH CPAP       No diagnosis found.    Past Surgical History:   Procedure Laterality Date   • COLONOSCOPY  2019   • CORONARY STENT PLACEMENT Right 05/12/2022    Right Coronary artery   • ENDOSCOPY N/A 01/14/2020    Procedure: ESOPHAGOGASTRODUODENOSCOPY WITH BIOPSY;  Surgeon: Kenrick Zambrano Jr., MD;  Location: [facility] ENDOSCOPY;  Service: General   • TYMPANOSTOMY TUBE PLACEMENT  2019       Patient Active Problem List   Diagnosis   • Class 3 severe obesity with body mass index (BMI) of 45.0 to 49.9 in adult ([facility])   • Essential hypertension   • Chronic fatigue   • Insomnia   • Hyperlipidemia   • Sleep apnea   • GERD (gastroesophageal reflux disease)   • Depression with anxiety   • Bipolar 1 disorder ([facility])   • Hypothyroidism   • Class 3 severe obesity with serious comorbidity and body mass index (BMI) of 45.0 to 49.9 in adult ([facility])   • Gastroesophageal reflux disease   • Obstructive sleep apnea syndrome   • Obesity, Class III, BMI 40-49.9 (morbid obesity) ([facility])       No Known Allergies      Current Outpatient Medications:   •  atorvastatin (LIPITOR) 80 MG tablet, Take 1 tablet by mouth Daily., Disp: , Rfl:   •  buPROPion (WELLBUTRIN) 100 MG tablet, Take 1 tablet by mouth Daily., Disp: , Rfl:   •  carBAMazepine (TEGretol) 200 MG tablet, Take 100 mg by mouth 2 (Two) Times a Day. 100mg every am & 100mg every pm, Disp: , Rfl:   •  ezetimibe (ZETIA) 10 MG tablet, Take 1 tablet by mouth Daily., Disp: , Rfl:   •  FLUoxetine (PROzac) 20 MG capsule, Take 1 capsule by mouth Daily., Disp: , Rfl:   •  hydrOXYzine (ATARAX) 25 MG tablet, Take 2 tablets by mouth every night at bedtime., Disp: , Rfl:   •  levothyroxine (SYNTHROID, LEVOTHROID) 50 MCG tablet, Take 50 mcg by mouth Daily., Disp: , Rfl:   •  metoprolol tartrate (LOPRESSOR) 100 MG tablet, Take 1 tablet by mouth 2 (Two) Times a Day., Disp: , Rfl:    •  pantoprazole (PROTONIX) 40 MG pack packet, Take 40 mg by mouth Daily., Disp: , Rfl:   •  prazosin (MINIPRESS) 1 MG capsule, Take 1 mg by mouth Daily., Disp: , Rfl:   •  aspirin 81 MG chewable tablet, Chew 1 tablet Daily., Disp: , Rfl:     Social History     Socioeconomic History   • Marital status:    Tobacco Use   • Smoking status: Never   • Smokeless tobacco: Never   Vaping Use   • Vaping Use: Never used   Substance and Sexual Activity   • Alcohol use: Yes     Comment: OCCASIONAL   • Drug use: No   • Sexual activity: Defer       Family History   Problem Relation Age of Onset   • Hypertension Mother    • Stroke Mother    • Sleep apnea Mother    • Hypertension Father    • Heart attack Father 30   • Prostate cancer Father    • Heart disease Father          age 63   • Hypertension Brother    • Sleep apnea Brother    • Obesity Brother    • Heart attack Brother 30   • Obesity Maternal Grandmother    • Hypertension Maternal Grandmother    • Diabetes Maternal Grandmother    • Heart attack Maternal Grandmother    • Obesity Maternal Grandfather    • Hypertension Maternal Grandfather    • Heart attack Maternal Grandfather    • No Known Problems Sister    • Hypertension Paternal Grandmother    • Heart attack Paternal Grandmother    • Heart disease Paternal Grandmother    • Hypertension Paternal Grandfather    • Heart attack Paternal Grandfather    • Heart disease Paternal Grandfather    • Malig Hyperthermia Neg Hx        Review of Systems:  Review of Systems    Review of Systems   Constitutional: Negative.    HENT: Negative.    Eyes: Negative.    Respiratory: Negative.    Cardiovascular: Negative.    Gastrointestinal: Negative.    Endocrine: Negative.    Genitourinary: Negative.    Musculoskeletal: Negative.    Skin: Negative.    Allergic/Immunologic: Negative.    Neurological: Negative.    Hematological: Negative.    Psychiatric/Behavioral: Negative.      Physical Exam:  Body mass index is 43.75 kg/m².    Vital Signs:  Weight: 135 kg (298 lb 6.4 oz)   Body mass index is 43.75 kg/m².      Heart Rate: 61   BP: 120/82     Awake and alert  Normal mental status  Normal pulmonary effort  Abdomen appropriate tenderness  Incisions no erythema  Extremities no tenderness or swelling      Assessment:  Patient Active Problem List   Diagnosis   • Class 3 severe obesity with body mass index (BMI) of 45.0 to 49.9 in adult ([facility])   • Essential hypertension   • Chronic fatigue   • Insomnia   • Hyperlipidemia   • Sleep apnea   • GERD (gastroesophageal reflux disease)   • Depression with anxiety   • Bipolar 1 disorder ([facility])   • Hypothyroidism   • Class 3 severe obesity with serious comorbidity and body mass index (BMI) of 45.0 to 49.9 in adult ([facility])   • Gastroesophageal reflux disease   • Obstructive sleep apnea syndrome   • Obesity, Class III, BMI 40-49.9 (morbid obesity) ([facility])         Vikash Mccarty is a 47 y.o. year old male with medically complicated severe obesity with a BMI of Body mass index is 43.75 kg/m². and multiple co-morbidities listed in the encounter diagnosis.    I think he is an appropriate candidate for this surgery, and is ready to proceed.      The patient has returned to the office for a surgical consultation and has requested to proceed with a laparoscopic gastric sleeve.  I have had the opportunity to obtain a history, examine the patient and review the patient's chart.    The patient understands that surgery is a tool and that weight loss is not guaranteed but only seen in the context of appropriate use, regular follow up, exercise and making appropriate food choices.     I personally discussed the potential complications of the laparoscopic gastric sleeve with this patient.  The patient is well aware of potential complications of the surgery that include but not limited to bleeding, infections, deep vein thrombosis, pulmonary embolism, pulmonary complications such as pneumonia, cardiac event, hernias,  small bowel obstruction, damage to the spleen or other organs, bowel injury, disfiguring scars, failure to lose weight, need for additional surgery, conversion to an open procedure and death.  The patient is also aware of complications which apply in particular to the gastric sleeve and can include but not limited to the leakage of gastric contents at the staple line, the development of an intra-abdominal abscess, gastroesophageal reflux disease, Bolton's esophagus, ulcers, vitamin/mineral deficiencies, strictures, and the possibility of converting this procedure to a Neel-en-Y gastric bypass. The patient also understands the possibility of requiring an acid reducer medication for the rest of their life.    The risks, benefits, potential complications and alternative therapies were discussed at great length as outlined in our extensive consent forms, online consent and educational teaching processes.    The patient has confirmed the participation in the programs extensive educational activities.    All questions and concerns were answered to patient's satisfaction.  The patient now wishes to proceed with surgery.    Patient has [default value] the pre-operative insertion of an IVC filter.     The patient has [default value] a postoperative course of anitcoagulant therapy.        Plan/Discussion/Summary:        I instructed patient to start on a H2 blocker or proton pump inhibitor if not already on one of these medications.    I explained in detail the procedures that we perform.  All of these procedures have a chance to convert to open if any technical challenges or complications do occur.  Bariatric surgery is not cosmetic surgery but rather a tool to help a patient make a life-long commitment lifestyle change including diet, exercise, behavior changes, and taking supplemental vitamins and minerals.    Problems after surgery may require more operations to correct them.    The risks, benefits, alternatives, and  potential complications of all of the procedures were explained in detail including, but not limited to death, anesthesia and medication adverse effect, deep venous thrombosis, pulmonary embolism, trocar site/incisional hernia, wound infection, abdominal infection, bleeding, failure to lose weight, gain weight, a change in body image, metabolic complications with vitamin deficiences and anemia.    Weight loss expectations were discussed with the patient in detail. The weight loss operations most commonly performed are the sleeve gastrectomy and the Neel-en-Y gastric bypass. These operations result in weight losses up to approximately 25-35% of initial body weight 12 to 24 months after surgery with the gastric bypass usually the higher percent of weight loss but depends on patient using the tool.    For the gastric bypass and loop duodenal switch (LELIA-S) the risks include but not limited to the following early complications:  Anastomotic leak/peritonitis, Neel/Alimentary/biliopancreatic limb obstruction, severe & minor wound infection/seroma, and nausea/vomiting.  Late complications can include but are not limited to malnutrition, vitamin deficiencies, frequent loose stools,  stomal stenosis, marginal ulcer, bowel obstruction, intussusception, internal, and incisional hernia.    Regarding the gastric sleeve, there is less long-term outcome data and higher risk of dysphagia and reflux compared to a gastric bypass, as well as risk of internal visceral/organ injury, splenectomy, bleeding, infection, leak (which could require further intervention possible conversion to Neel-en-Y gastric bypass), stenosis and possibility of regaining weight.    Vikash was counseled regarding diagnostic results, instructions for management, risk factor reductions, prognosis, patient and family education, impressions, risks and benefits of treatment options and importance of compliance with treatment. Total face to face time of the  encounter was over 45 minutes and over 30 minutes was spent counseling.     Kai Report   As part of this patient's treatment plan I am prescribing controlled substances. The patient has been made aware of appropriate use of such medications, including potential risk of somnolence, limited ability to drive and /or work safely, and potential for dependence or overdose. It has also been made clear that these medications are for use by this patient only, without concomitant use of alcohol or other substances unless prescribed.    Vikash has completed prescribing agreement detailing terms of continued prescribing of controlled substances, including monitoring KAI reports, urine drug screening, and pill counts if necessary. Vikash is aware that inappropriate use will result in cessation of prescribing such medications.    KAI report has been reviewed      History and physical exam exhibit continued safe and appropriate use of controlled substances.      Vikash understands the surgical procedures and the different surgical options that are available.  He understands the lifestyle changes that are required after surgery and has agreed to follow the guidelines outlined in the weight management program.  He also expressed understanding of the risks involved and had all of male questions answered and desires to proceed.      Ryann Santos MD  11/4/2022  Answers for HPI/ROS submitted by the patient on 11/3/2022  What is the primary reason for your visit?: Other  Please describe your symptoms.: Pre op visit  Have you had these symptoms before?: No  How long have you been having these symptoms?: 1-4 days  Please list any medications you are currently taking for this condition.: Bringing a list for you  Please describe any probable cause for these symptoms. : No symptoms

## 2022-11-04 NOTE — PROGRESS NOTES
"Bariatric Consult:    Chief Complaint: Morbid Obesity  Referred by Romel Cooper MD    Vikash Mccarty is here today for consult on Morbid Obesity    History of Present Illness:     Vikash Mccarty is a 47 y.o. male with morbid obesity with co-morbidities including  has a past medical history of Anxiety and depression, Bipolar 1 disorder (HCC), Chest pain, Disease of thyroid gland, GERD (gastroesophageal reflux disease), Hyperlipidemia, Hypertension, Migraines, Paranoid schizophrenia in remission (HCC), and Sleep apnea.  who presents for surgical consultation for the above procedure. Vikash has completed the initial intake visit and has been examined by our nurse practitioner, dietician, psychologist and underwent the extensive educational teaching process under the guidance of our bariatric coordinator and myself. Vikash also has seen the educational video SWAPNA on the surgical procedure if available. Vikash attended today more educational teaching from our bariatric coordinator and myself. Vikash has had an extensive medical workup including a visit with their primary care physician, EKG, chest radiograph, blood work, EGD or UGI and possibly further testing. These have been reviewed by me and discussed with the patient. Vikash is now ready to proceed with surgery. Vikash presently denies nausea, vomiting, fever, chills, chest pain, shortness of air, melena, hematochezia, hemetemesis, dysuria, frequency, hematuria, jaundice or abdominal pain.     Wt Readings from Last 10 Encounters:   11/04/22 135 kg (298 lb 6.4 oz)   01/27/22 (!) 144 kg (316 lb 12.8 oz)   01/14/20 (!) 155 kg (342 lb 3.2 oz)   10/22/19 (!) 154 kg (338 lb 8 oz)       The  pre-op EGD shows   normal    Past Medical History:   Diagnosis Date   • Anxiety and depression    • Bipolar 1 disorder (HCC)    • Chest pain     \"NON CARDIAC\"   [facility]   • Disease of thyroid gland    • GERD (gastroesophageal reflux disease)    • " Hyperlipidemia    • Hypertension    • Migraines    • Paranoid schizophrenia in remission ([facility])    • Sleep apnea     COMPLIANT WITH CPAP       No diagnosis found.    Past Surgical History:   Procedure Laterality Date   • COLONOSCOPY  2019   • CORONARY STENT PLACEMENT Right 05/12/2022    Right Coronary artery   • ENDOSCOPY N/A 01/14/2020    Procedure: ESOPHAGOGASTRODUODENOSCOPY WITH BIOPSY;  Surgeon: Kenrick Zambrano Jr., MD;  Location: [facility] ENDOSCOPY;  Service: General   • TYMPANOSTOMY TUBE PLACEMENT  2019       Patient Active Problem List   Diagnosis   • Class 3 severe obesity with body mass index (BMI) of 45.0 to 49.9 in adult ([facility])   • Essential hypertension   • Chronic fatigue   • Insomnia   • Hyperlipidemia   • Sleep apnea   • GERD (gastroesophageal reflux disease)   • Depression with anxiety   • Bipolar 1 disorder ([facility])   • Hypothyroidism   • Class 3 severe obesity with serious comorbidity and body mass index (BMI) of 45.0 to 49.9 in adult ([facility])   • Gastroesophageal reflux disease   • Obstructive sleep apnea syndrome   • Obesity, Class III, BMI 40-49.9 (morbid obesity) ([facility])       No Known Allergies      Current Outpatient Medications:   •  atorvastatin (LIPITOR) 80 MG tablet, Take 1 tablet by mouth Daily., Disp: , Rfl:   •  buPROPion (WELLBUTRIN) 100 MG tablet, Take 1 tablet by mouth Daily., Disp: , Rfl:   •  carBAMazepine (TEGretol) 200 MG tablet, Take 100 mg by mouth 2 (Two) Times a Day. 100mg every am & 100mg every pm, Disp: , Rfl:   •  ezetimibe (ZETIA) 10 MG tablet, Take 1 tablet by mouth Daily., Disp: , Rfl:   •  FLUoxetine (PROzac) 20 MG capsule, Take 1 capsule by mouth Daily., Disp: , Rfl:   •  hydrOXYzine (ATARAX) 25 MG tablet, Take 2 tablets by mouth every night at bedtime., Disp: , Rfl:   •  levothyroxine (SYNTHROID, LEVOTHROID) 50 MCG tablet, Take 50 mcg by mouth Daily., Disp: , Rfl:   •  metoprolol tartrate (LOPRESSOR) 100 MG tablet, Take 1 tablet by mouth 2 (Two) Times a Day., Disp: , Rfl:    •  pantoprazole (PROTONIX) 40 MG pack packet, Take 40 mg by mouth Daily., Disp: , Rfl:   •  prazosin (MINIPRESS) 1 MG capsule, Take 1 mg by mouth Daily., Disp: , Rfl:   •  aspirin 81 MG chewable tablet, Chew 1 tablet Daily., Disp: , Rfl:     Social History     Socioeconomic History   • Marital status:    Tobacco Use   • Smoking status: Never   • Smokeless tobacco: Never   Vaping Use   • Vaping Use: Never used   Substance and Sexual Activity   • Alcohol use: Yes     Comment: OCCASIONAL   • Drug use: No   • Sexual activity: Defer       Family History   Problem Relation Age of Onset   • Hypertension Mother    • Stroke Mother    • Sleep apnea Mother    • Hypertension Father    • Heart attack Father 30   • Prostate cancer Father    • Heart disease Father          age 63   • Hypertension Brother    • Sleep apnea Brother    • Obesity Brother    • Heart attack Brother 30   • Obesity Maternal Grandmother    • Hypertension Maternal Grandmother    • Diabetes Maternal Grandmother    • Heart attack Maternal Grandmother    • Obesity Maternal Grandfather    • Hypertension Maternal Grandfather    • Heart attack Maternal Grandfather    • No Known Problems Sister    • Hypertension Paternal Grandmother    • Heart attack Paternal Grandmother    • Heart disease Paternal Grandmother    • Hypertension Paternal Grandfather    • Heart attack Paternal Grandfather    • Heart disease Paternal Grandfather    • Malig Hyperthermia Neg Hx        Review of Systems:  Review of Systems    Review of Systems   Constitutional: Negative.    HENT: Negative.    Eyes: Negative.    Respiratory: Negative.    Cardiovascular: Negative.    Gastrointestinal: Negative.    Endocrine: Negative.    Genitourinary: Negative.    Musculoskeletal: Negative.    Skin: Negative.    Allergic/Immunologic: Negative.    Neurological: Negative.    Hematological: Negative.    Psychiatric/Behavioral: Negative.      Physical Exam:  Body mass index is 43.75 kg/m².    Vital Signs:  Weight: 135 kg (298 lb 6.4 oz)   Body mass index is 43.75 kg/m².      Heart Rate: 61   BP: 120/82     Awake and alert  Normal mental status  Normal pulmonary effort  Abdomen appropriate tenderness  Incisions no erythema  Extremities no tenderness or swelling      Assessment:  Patient Active Problem List   Diagnosis   • Class 3 severe obesity with body mass index (BMI) of 45.0 to 49.9 in adult ([facility])   • Essential hypertension   • Chronic fatigue   • Insomnia   • Hyperlipidemia   • Sleep apnea   • GERD (gastroesophageal reflux disease)   • Depression with anxiety   • Bipolar 1 disorder ([facility])   • Hypothyroidism   • Class 3 severe obesity with serious comorbidity and body mass index (BMI) of 45.0 to 49.9 in adult ([facility])   • Gastroesophageal reflux disease   • Obstructive sleep apnea syndrome   • Obesity, Class III, BMI 40-49.9 (morbid obesity) ([facility])         Vikash Mccarty is a 47 y.o. year old male with medically complicated severe obesity with a BMI of Body mass index is 43.75 kg/m². and multiple co-morbidities listed in the encounter diagnosis.    I think he is an appropriate candidate for this surgery, and is ready to proceed.      The patient has returned to the office for a surgical consultation and has requested to proceed with a laparoscopic gastric sleeve.  I have had the opportunity to obtain a history, examine the patient and review the patient's chart.    The patient understands that surgery is a tool and that weight loss is not guaranteed but only seen in the context of appropriate use, regular follow up, exercise and making appropriate food choices.     I personally discussed the potential complications of the laparoscopic gastric sleeve with this patient.  The patient is well aware of potential complications of the surgery that include but not limited to bleeding, infections, deep vein thrombosis, pulmonary embolism, pulmonary complications such as pneumonia, cardiac event, hernias,  small bowel obstruction, damage to the spleen or other organs, bowel injury, disfiguring scars, failure to lose weight, need for additional surgery, conversion to an open procedure and death.  The patient is also aware of complications which apply in particular to the gastric sleeve and can include but not limited to the leakage of gastric contents at the staple line, the development of an intra-abdominal abscess, gastroesophageal reflux disease, Bolton's esophagus, ulcers, vitamin/mineral deficiencies, strictures, and the possibility of converting this procedure to a Neel-en-Y gastric bypass. The patient also understands the possibility of requiring an acid reducer medication for the rest of their life.    The risks, benefits, potential complications and alternative therapies were discussed at great length as outlined in our extensive consent forms, online consent and educational teaching processes.    The patient has confirmed the participation in the programs extensive educational activities.    All questions and concerns were answered to patient's satisfaction.  The patient now wishes to proceed with surgery.    Patient has [default value] the pre-operative insertion of an IVC filter.     The patient has [default value] a postoperative course of anitcoagulant therapy.        Plan/Discussion/Summary:        I instructed patient to start on a H2 blocker or proton pump inhibitor if not already on one of these medications.    I explained in detail the procedures that we perform.  All of these procedures have a chance to convert to open if any technical challenges or complications do occur.  Bariatric surgery is not cosmetic surgery but rather a tool to help a patient make a life-long commitment lifestyle change including diet, exercise, behavior changes, and taking supplemental vitamins and minerals.    Problems after surgery may require more operations to correct them.    The risks, benefits, alternatives, and  potential complications of all of the procedures were explained in detail including, but not limited to death, anesthesia and medication adverse effect, deep venous thrombosis, pulmonary embolism, trocar site/incisional hernia, wound infection, abdominal infection, bleeding, failure to lose weight, gain weight, a change in body image, metabolic complications with vitamin deficiences and anemia.    Weight loss expectations were discussed with the patient in detail. The weight loss operations most commonly performed are the sleeve gastrectomy and the Neel-en-Y gastric bypass. These operations result in weight losses up to approximately 25-35% of initial body weight 12 to 24 months after surgery with the gastric bypass usually the higher percent of weight loss but depends on patient using the tool.    For the gastric bypass and loop duodenal switch (LELIA-S) the risks include but not limited to the following early complications:  Anastomotic leak/peritonitis, Neel/Alimentary/biliopancreatic limb obstruction, severe & minor wound infection/seroma, and nausea/vomiting.  Late complications can include but are not limited to malnutrition, vitamin deficiencies, frequent loose stools,  stomal stenosis, marginal ulcer, bowel obstruction, intussusception, internal, and incisional hernia.    Regarding the gastric sleeve, there is less long-term outcome data and higher risk of dysphagia and reflux compared to a gastric bypass, as well as risk of internal visceral/organ injury, splenectomy, bleeding, infection, leak (which could require further intervention possible conversion to Neel-en-Y gastric bypass), stenosis and possibility of regaining weight.    Vikash was counseled regarding diagnostic results, instructions for management, risk factor reductions, prognosis, patient and family education, impressions, risks and benefits of treatment options and importance of compliance with treatment. Total face to face time of the  encounter was over 45 minutes and over 30 minutes was spent counseling.     Kai Report   As part of this patient's treatment plan I am prescribing controlled substances. The patient has been made aware of appropriate use of such medications, including potential risk of somnolence, limited ability to drive and /or work safely, and potential for dependence or overdose. It has also been made clear that these medications are for use by this patient only, without concomitant use of alcohol or other substances unless prescribed.    Vikash has completed prescribing agreement detailing terms of continued prescribing of controlled substances, including monitoring KAI reports, urine drug screening, and pill counts if necessary. Vikash is aware that inappropriate use will result in cessation of prescribing such medications.    KAI report has been reviewed      History and physical exam exhibit continued safe and appropriate use of controlled substances.      Vikash understands the surgical procedures and the different surgical options that are available.  He understands the lifestyle changes that are required after surgery and has agreed to follow the guidelines outlined in the weight management program.  He also expressed understanding of the risks involved and had all of male questions answered and desires to proceed.      Ryann Santos MD  11/4/2022  Answers for HPI/ROS submitted by the patient on 11/3/2022  What is the primary reason for your visit?: Other  Please describe your symptoms.: Pre op visit  Have you had these symptoms before?: No  How long have you been having these symptoms?: 1-4 days  Please list any medications you are currently taking for this condition.: Bringing a list for you  Please describe any probable cause for these symptoms. : No symptoms

## 2022-11-04 NOTE — TELEPHONE ENCOUNTER
[facility] states that patient is on clopidogrel (Plavix) 75mg qd  [facility] and [facility] as had new stent place 5/12/22. [place]

## 2022-11-04 NOTE — PAT
"SC to [facility] Marley to see it pt to stop ASA per Dr. Santos.  Awaiting response.  Pt has cardiac clearance and was advised by cardiologist \"continue ASA if possible, but can stop if surgeon decides\"  "

## 2022-11-08 ENCOUNTER — ANESTHESIA EVENT (OUTPATIENT)
Dept: PERIOP | Facility: HOSPITAL | Age: 47
End: 2022-11-08

## 2022-11-09 ENCOUNTER — ANESTHESIA (OUTPATIENT)
Dept: PERIOP | Facility: HOSPITAL | Age: 47
End: 2022-11-09

## 2022-11-09 ENCOUNTER — HOSPITAL ENCOUNTER (INPATIENT)
Facility: HOSPITAL | Age: 47
LOS: 1 days | Discharge: HOME | End: 2022-11-10
Attending: SURGERY | Admitting: SURGERY

## 2022-11-09 DIAGNOSIS — G89.18 POST-OP PAIN: Primary | ICD-10-CM

## 2022-11-09 DIAGNOSIS — E66.01 OBESITY, CLASS III, BMI 40-49.9 (MORBID OBESITY): ICD-10-CM

## 2022-11-09 LAB
ALBUMIN SERPL-MCNC: 4.6 G/DL (ref 3.5–5.2)
ALBUMIN/GLOB SERPL: 1.9 G/DL
ALP SERPL-CCNC: 100 U/L (ref 39–117)
ALT SERPL W P-5'-P-CCNC: 41 U/L (ref 1–41)
ANION GAP SERPL CALCULATED.3IONS-SCNC: 10 MMOL/L (ref 5–15)
AST SERPL-CCNC: 29 U/L (ref 1–40)
BASOPHILS # BLD AUTO: 0.1 10*3/MM3 (ref 0–0.2)
BASOPHILS NFR BLD AUTO: 0.4 % (ref 0–1.5)
BILIRUB SERPL-MCNC: 0.4 MG/DL (ref 0–1.2)
BUN SERPL-MCNC: 19 MG/DL (ref 6–20)
BUN/CREAT SERPL: 14.7 (ref 7–25)
CALCIUM SPEC-SCNC: 9 MG/DL (ref 8.6–10.5)
CHLORIDE SERPL-SCNC: 99 MMOL/L (ref 98–107)
CO2 SERPL-SCNC: 27 MMOL/L (ref 22–29)
CREAT SERPL-MCNC: 1.29 MG/DL (ref 0.76–1.27)
DEPRECATED RDW RBC AUTO: 42.4 FL (ref 37–54)
EGFRCR SERPLBLD CKD-EPI 2021: 69.7 ML/MIN/1.73
EOSINOPHIL # BLD AUTO: 0 10*3/MM3 (ref 0–0.4)
EOSINOPHIL NFR BLD AUTO: 0.1 % (ref 0.3–6.2)
ERYTHROCYTE [DISTWIDTH] IN BLOOD BY AUTOMATED COUNT: 13.4 % (ref 12.3–15.4)
GLOBULIN UR ELPH-MCNC: 2.4 GM/DL
GLUCOSE SERPL-MCNC: 116 MG/DL (ref 65–99)
HCT VFR BLD AUTO: 39.5 % (ref 37.5–51)
HGB BLD-MCNC: 13.5 G/DL (ref 13–17.7)
LYMPHOCYTES # BLD AUTO: 1.3 10*3/MM3 (ref 0.7–3.1)
LYMPHOCYTES NFR BLD AUTO: 11.2 % (ref 19.6–45.3)
MAGNESIUM SERPL-MCNC: 1.9 MG/DL (ref 1.6–2.6)
MCH RBC QN AUTO: 29.2 PG (ref 26.6–33)
MCHC RBC AUTO-ENTMCNC: 34.1 G/DL (ref 31.5–35.7)
MCV RBC AUTO: 85.6 FL (ref 79–97)
MONOCYTES # BLD AUTO: 0.4 10*3/MM3 (ref 0.1–0.9)
MONOCYTES NFR BLD AUTO: 3 % (ref 5–12)
NEUTROPHILS NFR BLD AUTO: 85.3 % (ref 42.7–76)
NEUTROPHILS NFR BLD AUTO: 9.9 10*3/MM3 (ref 1.7–7)
NRBC BLD AUTO-RTO: 0 /100 WBC (ref 0–0.2)
PHOSPHATE SERPL-MCNC: 3.6 MG/DL (ref 2.5–4.5)
PLATELET # BLD AUTO: 220 10*3/MM3 (ref 140–450)
PMV BLD AUTO: 7.4 FL (ref 6–12)
POTASSIUM SERPL-SCNC: 4.5 MMOL/L (ref 3.5–5.2)
PROT SERPL-MCNC: 7 G/DL (ref 6–8.5)
RBC # BLD AUTO: 4.61 10*6/MM3 (ref 4.14–5.8)
SODIUM SERPL-SCNC: 136 MMOL/L (ref 136–145)
WBC NRBC COR # BLD: 11.6 10*3/MM3 (ref 3.4–10.8)

## 2022-11-09 PROCEDURE — C9290 INJ, BUPIVACAINE LIPOSOME: HCPCS | Performed by: SURGERY

## 2022-11-09 PROCEDURE — 25010000002 ONDANSETRON PER 1 MG: Performed by: NURSE ANESTHETIST, CERTIFIED REGISTERED

## 2022-11-09 PROCEDURE — S2900 ROBOTIC SURGICAL SYSTEM: HCPCS | Performed by: SURGERY

## 2022-11-09 PROCEDURE — 0 BUPIVACAINE LIPOSOME 1.3 % SUSPENSION 10 ML VIAL: Performed by: SURGERY

## 2022-11-09 PROCEDURE — 25010000002 PROPOFOL 200 MG/20ML EMULSION: Performed by: NURSE ANESTHETIST, CERTIFIED REGISTERED

## 2022-11-09 PROCEDURE — 25010000002 FENTANYL CITRATE (PF) 100 MCG/2ML SOLUTION: Performed by: NURSE ANESTHETIST, CERTIFIED REGISTERED

## 2022-11-09 PROCEDURE — 25010000002 METOCLOPRAMIDE PER 10 MG: Performed by: SURGERY

## 2022-11-09 PROCEDURE — 25010000002 HYDROMORPHONE PER 4 MG: Performed by: NURSE ANESTHETIST, CERTIFIED REGISTERED

## 2022-11-09 PROCEDURE — 88307 TISSUE EXAM BY PATHOLOGIST: CPT | Performed by: SURGERY

## 2022-11-09 PROCEDURE — 25010000002 CEFAZOLIN PER 500 MG: Performed by: SURGERY

## 2022-11-09 PROCEDURE — 0DB64Z3 EXCISION OF STOMACH, PERCUTANEOUS ENDOSCOPIC APPROACH, VERTICAL: ICD-10-PCS | Performed by: SURGERY

## 2022-11-09 PROCEDURE — 25010000002 PROPOFOL 1000 MG/100ML EMULSION: Performed by: NURSE ANESTHETIST, CERTIFIED REGISTERED

## 2022-11-09 PROCEDURE — 43775 LAP SLEEVE GASTRECTOMY: CPT | Performed by: SPECIALIST/TECHNOLOGIST, OTHER

## 2022-11-09 PROCEDURE — 83735 ASSAY OF MAGNESIUM: CPT | Performed by: SURGERY

## 2022-11-09 PROCEDURE — 85025 COMPLETE CBC W/AUTO DIFF WBC: CPT | Performed by: SURGERY

## 2022-11-09 PROCEDURE — 80053 COMPREHEN METABOLIC PANEL: CPT | Performed by: SURGERY

## 2022-11-09 PROCEDURE — 84100 ASSAY OF PHOSPHORUS: CPT | Performed by: SURGERY

## 2022-11-09 PROCEDURE — 25010000002 DEXAMETHASONE PER 1 MG: Performed by: NURSE ANESTHETIST, CERTIFIED REGISTERED

## 2022-11-09 PROCEDURE — 25010000002 MIDAZOLAM PER 1 MG: Performed by: NURSE ANESTHETIST, CERTIFIED REGISTERED

## 2022-11-09 PROCEDURE — 8E0Y4CZ ROBOTIC ASSISTED PROCEDURE OF LOWER EXTREMITY, PERCUTANEOUS ENDOSCOPIC APPROACH: ICD-10-PCS | Performed by: SURGERY

## 2022-11-09 PROCEDURE — 43775 LAP SLEEVE GASTRECTOMY: CPT | Performed by: SURGERY

## 2022-11-09 DEVICE — STAPLER 60 RELOAD WHITE
Type: IMPLANTABLE DEVICE | Site: ABDOMEN | Status: FUNCTIONAL
Brand: SUREFORM

## 2022-11-09 DEVICE — STAPLER 60 RELOAD BLUE
Type: IMPLANTABLE DEVICE | Site: ABDOMEN | Status: FUNCTIONAL
Brand: SUREFORM

## 2022-11-09 DEVICE — SEALANT WND FIBRIN TISSEEL PREFIL/SYR/PRIMAFZ 4ML: Type: IMPLANTABLE DEVICE | Site: ABDOMEN | Status: FUNCTIONAL

## 2022-11-09 RX ORDER — ONDANSETRON 4 MG/1
8 TABLET, FILM COATED ORAL EVERY 6 HOURS PRN
Status: DISCONTINUED | OUTPATIENT
Start: 2022-11-09 | End: 2022-11-10 | Stop reason: HOSPADM

## 2022-11-09 RX ORDER — FLUOXETINE HYDROCHLORIDE 20 MG/1
80 CAPSULE ORAL DAILY
Status: DISCONTINUED | OUTPATIENT
Start: 2022-11-10 | End: 2022-11-10 | Stop reason: HOSPADM

## 2022-11-09 RX ORDER — HYDRALAZINE HYDROCHLORIDE 20 MG/ML
10 INJECTION INTRAMUSCULAR; INTRAVENOUS
Status: DISCONTINUED | OUTPATIENT
Start: 2022-11-09 | End: 2022-11-10 | Stop reason: HOSPADM

## 2022-11-09 RX ORDER — NEOSTIGMINE METHYLSULFATE 5 MG/5 ML
SYRINGE (ML) INTRAVENOUS AS NEEDED
Status: DISCONTINUED | OUTPATIENT
Start: 2022-11-09 | End: 2022-11-09 | Stop reason: SURG

## 2022-11-09 RX ORDER — ONDANSETRON 2 MG/ML
4 INJECTION INTRAMUSCULAR; INTRAVENOUS ONCE AS NEEDED
Status: DISCONTINUED | OUTPATIENT
Start: 2022-11-09 | End: 2022-11-09 | Stop reason: HOSPADM

## 2022-11-09 RX ORDER — ACETAMINOPHEN 500 MG
1000 TABLET ORAL EVERY 6 HOURS
Status: DISCONTINUED | OUTPATIENT
Start: 2022-11-09 | End: 2022-11-10 | Stop reason: HOSPADM

## 2022-11-09 RX ORDER — SODIUM CHLORIDE 0.9 % (FLUSH) 0.9 %
3-10 SYRINGE (ML) INJECTION AS NEEDED
Status: DISCONTINUED | OUTPATIENT
Start: 2022-11-09 | End: 2022-11-09 | Stop reason: HOSPADM

## 2022-11-09 RX ORDER — CHLORHEXIDINE GLUCONATE 0.12 MG/ML
15 RINSE ORAL SEE ADMIN INSTRUCTIONS
Status: COMPLETED | OUTPATIENT
Start: 2022-11-09 | End: 2022-11-09

## 2022-11-09 RX ORDER — PANTOPRAZOLE SODIUM 40 MG/10ML
40 INJECTION, POWDER, LYOPHILIZED, FOR SOLUTION INTRAVENOUS ONCE
Status: COMPLETED | OUTPATIENT
Start: 2022-11-09 | End: 2022-11-09

## 2022-11-09 RX ORDER — ONDANSETRON 2 MG/ML
INJECTION INTRAMUSCULAR; INTRAVENOUS AS NEEDED
Status: DISCONTINUED | OUTPATIENT
Start: 2022-11-09 | End: 2022-11-09 | Stop reason: SURG

## 2022-11-09 RX ORDER — HYDROMORPHONE HYDROCHLORIDE 2 MG/1
2 TABLET ORAL EVERY 4 HOURS PRN
Status: DISCONTINUED | OUTPATIENT
Start: 2022-11-09 | End: 2022-11-10 | Stop reason: HOSPADM

## 2022-11-09 RX ORDER — PANTOPRAZOLE SODIUM 40 MG/1
40 TABLET, DELAYED RELEASE ORAL 2 TIMES DAILY
Status: DISCONTINUED | OUTPATIENT
Start: 2022-11-09 | End: 2022-11-10 | Stop reason: HOSPADM

## 2022-11-09 RX ORDER — HYDROXYZINE HYDROCHLORIDE 25 MG/1
50 TABLET, FILM COATED ORAL NIGHTLY
Status: DISCONTINUED | OUTPATIENT
Start: 2022-11-09 | End: 2022-11-10 | Stop reason: HOSPADM

## 2022-11-09 RX ORDER — SCOLOPAMINE TRANSDERMAL SYSTEM 1 MG/1
1 PATCH, EXTENDED RELEASE TRANSDERMAL ONCE
Status: DISCONTINUED | OUTPATIENT
Start: 2022-11-09 | End: 2022-11-09

## 2022-11-09 RX ORDER — METOCLOPRAMIDE HYDROCHLORIDE 5 MG/ML
10 INJECTION INTRAMUSCULAR; INTRAVENOUS ONCE
Status: COMPLETED | OUTPATIENT
Start: 2022-11-09 | End: 2022-11-09

## 2022-11-09 RX ORDER — DROPERIDOL 2.5 MG/ML
0.62 INJECTION, SOLUTION INTRAMUSCULAR; INTRAVENOUS ONCE AS NEEDED
Status: DISCONTINUED | OUTPATIENT
Start: 2022-11-09 | End: 2022-11-09 | Stop reason: HOSPADM

## 2022-11-09 RX ORDER — FLUOXETINE HYDROCHLORIDE 20 MG/1
20 CAPSULE ORAL
Status: DISCONTINUED | OUTPATIENT
Start: 2022-11-10 | End: 2022-11-09

## 2022-11-09 RX ORDER — HYDROCHLOROTHIAZIDE 25 MG/1
25 TABLET ORAL DAILY
COMMUNITY

## 2022-11-09 RX ORDER — CARBAMAZEPINE 200 MG/1
200 TABLET ORAL NIGHTLY
Status: DISCONTINUED | OUTPATIENT
Start: 2022-11-09 | End: 2022-11-10 | Stop reason: HOSPADM

## 2022-11-09 RX ORDER — ACETAMINOPHEN 160 MG/5ML
1000 SOLUTION ORAL EVERY 6 HOURS
Status: DISCONTINUED | OUTPATIENT
Start: 2022-11-09 | End: 2022-11-10 | Stop reason: HOSPADM

## 2022-11-09 RX ORDER — DIPHENHYDRAMINE HYDROCHLORIDE 50 MG/ML
25 INJECTION INTRAMUSCULAR; INTRAVENOUS EVERY 4 HOURS PRN
Status: DISCONTINUED | OUTPATIENT
Start: 2022-11-09 | End: 2022-11-10 | Stop reason: HOSPADM

## 2022-11-09 RX ORDER — PROCHLORPERAZINE EDISYLATE 5 MG/ML
10 INJECTION INTRAMUSCULAR; INTRAVENOUS EVERY 6 HOURS PRN
Status: DISCONTINUED | OUTPATIENT
Start: 2022-11-09 | End: 2022-11-10 | Stop reason: HOSPADM

## 2022-11-09 RX ORDER — PROPOFOL 10 MG/ML
INJECTION, EMULSION INTRAVENOUS AS NEEDED
Status: DISCONTINUED | OUTPATIENT
Start: 2022-11-09 | End: 2022-11-09 | Stop reason: SURG

## 2022-11-09 RX ORDER — PROPOFOL 10 MG/ML
INJECTION, EMULSION INTRAVENOUS CONTINUOUS PRN
Status: DISCONTINUED | OUTPATIENT
Start: 2022-11-09 | End: 2022-11-09 | Stop reason: SURG

## 2022-11-09 RX ORDER — ALBUTEROL SULFATE 2.5 MG/3ML
2.5 SOLUTION RESPIRATORY (INHALATION) EVERY 4 HOURS PRN
Status: DISCONTINUED | OUTPATIENT
Start: 2022-11-09 | End: 2022-11-10 | Stop reason: HOSPADM

## 2022-11-09 RX ORDER — CEFAZOLIN SODIUM IN 0.9 % NACL 3 G/100 ML
3 INTRAVENOUS SOLUTION, PIGGYBACK (ML) INTRAVENOUS ONCE
Status: COMPLETED | OUTPATIENT
Start: 2022-11-09 | End: 2022-11-09

## 2022-11-09 RX ORDER — CARBAMAZEPINE 200 MG/1
100 TABLET ORAL
Status: DISCONTINUED | OUTPATIENT
Start: 2022-11-10 | End: 2022-11-10 | Stop reason: HOSPADM

## 2022-11-09 RX ORDER — SODIUM CHLORIDE, SODIUM LACTATE, POTASSIUM CHLORIDE, CALCIUM CHLORIDE 600; 310; 30; 20 MG/100ML; MG/100ML; MG/100ML; MG/100ML
150 INJECTION, SOLUTION INTRAVENOUS CONTINUOUS
Status: DISCONTINUED | OUTPATIENT
Start: 2022-11-09 | End: 2022-11-10 | Stop reason: HOSPADM

## 2022-11-09 RX ORDER — KETAMINE HCL IN NACL, ISO-OSM 100MG/10ML
SYRINGE (ML) INJECTION AS NEEDED
Status: DISCONTINUED | OUTPATIENT
Start: 2022-11-09 | End: 2022-11-09 | Stop reason: SURG

## 2022-11-09 RX ORDER — ROCURONIUM BROMIDE 10 MG/ML
INJECTION, SOLUTION INTRAVENOUS AS NEEDED
Status: DISCONTINUED | OUTPATIENT
Start: 2022-11-09 | End: 2022-11-09 | Stop reason: SURG

## 2022-11-09 RX ORDER — MIDAZOLAM HYDROCHLORIDE 1 MG/ML
INJECTION INTRAMUSCULAR; INTRAVENOUS AS NEEDED
Status: DISCONTINUED | OUTPATIENT
Start: 2022-11-09 | End: 2022-11-09 | Stop reason: SURG

## 2022-11-09 RX ORDER — LIDOCAINE HYDROCHLORIDE 20 MG/ML
INJECTION, SOLUTION EPIDURAL; INFILTRATION; INTRACAUDAL; PERINEURAL AS NEEDED
Status: DISCONTINUED | OUTPATIENT
Start: 2022-11-09 | End: 2022-11-09 | Stop reason: SURG

## 2022-11-09 RX ORDER — SODIUM CHLORIDE, SODIUM LACTATE, POTASSIUM CHLORIDE, CALCIUM CHLORIDE 600; 310; 30; 20 MG/100ML; MG/100ML; MG/100ML; MG/100ML
INJECTION, SOLUTION INTRAVENOUS CONTINUOUS PRN
Status: DISCONTINUED | OUTPATIENT
Start: 2022-11-09 | End: 2022-11-09 | Stop reason: SURG

## 2022-11-09 RX ORDER — METOCLOPRAMIDE HYDROCHLORIDE 5 MG/ML
10 INJECTION INTRAMUSCULAR; INTRAVENOUS EVERY 6 HOURS PRN
Status: DISCONTINUED | OUTPATIENT
Start: 2022-11-09 | End: 2022-11-10 | Stop reason: HOSPADM

## 2022-11-09 RX ORDER — LEVOTHYROXINE SODIUM 0.05 MG/1
50 TABLET ORAL DAILY
Status: DISCONTINUED | OUTPATIENT
Start: 2022-11-10 | End: 2022-11-10 | Stop reason: HOSPADM

## 2022-11-09 RX ORDER — METOPROLOL TARTRATE 50 MG/1
100 TABLET, FILM COATED ORAL 2 TIMES DAILY
Status: DISCONTINUED | OUTPATIENT
Start: 2022-11-09 | End: 2022-11-10 | Stop reason: HOSPADM

## 2022-11-09 RX ORDER — DEXAMETHASONE SODIUM PHOSPHATE 4 MG/ML
INJECTION, SOLUTION INTRA-ARTICULAR; INTRALESIONAL; INTRAMUSCULAR; INTRAVENOUS; SOFT TISSUE AS NEEDED
Status: DISCONTINUED | OUTPATIENT
Start: 2022-11-09 | End: 2022-11-09 | Stop reason: SURG

## 2022-11-09 RX ORDER — HYDROMORPHONE HCL 110MG/55ML
PATIENT CONTROLLED ANALGESIA SYRINGE INTRAVENOUS AS NEEDED
Status: DISCONTINUED | OUTPATIENT
Start: 2022-11-09 | End: 2022-11-09 | Stop reason: SURG

## 2022-11-09 RX ORDER — BUPROPION HYDROCHLORIDE 100 MG/1
100 TABLET ORAL
Status: DISCONTINUED | OUTPATIENT
Start: 2022-11-10 | End: 2022-11-10 | Stop reason: HOSPADM

## 2022-11-09 RX ORDER — TERAZOSIN 1 MG/1
1 CAPSULE ORAL NIGHTLY
Status: DISCONTINUED | OUTPATIENT
Start: 2022-11-09 | End: 2022-11-10 | Stop reason: HOSPADM

## 2022-11-09 RX ORDER — SODIUM CHLORIDE, SODIUM LACTATE, POTASSIUM CHLORIDE, CALCIUM CHLORIDE 600; 310; 30; 20 MG/100ML; MG/100ML; MG/100ML; MG/100ML
100 INJECTION, SOLUTION INTRAVENOUS CONTINUOUS
Status: DISCONTINUED | OUTPATIENT
Start: 2022-11-09 | End: 2022-11-09

## 2022-11-09 RX ORDER — NALOXONE HCL 0.4 MG/ML
0.1 VIAL (ML) INJECTION
Status: DISCONTINUED | OUTPATIENT
Start: 2022-11-09 | End: 2022-11-10 | Stop reason: HOSPADM

## 2022-11-09 RX ORDER — FENTANYL CITRATE 50 UG/ML
INJECTION, SOLUTION INTRAMUSCULAR; INTRAVENOUS AS NEEDED
Status: DISCONTINUED | OUTPATIENT
Start: 2022-11-09 | End: 2022-11-09 | Stop reason: SURG

## 2022-11-09 RX ORDER — HYDROCHLOROTHIAZIDE 25 MG/1
25 TABLET ORAL DAILY
Status: DISCONTINUED | OUTPATIENT
Start: 2022-11-09 | End: 2022-11-10 | Stop reason: HOSPADM

## 2022-11-09 RX ORDER — ONDANSETRON 2 MG/ML
8 INJECTION INTRAMUSCULAR; INTRAVENOUS EVERY 6 HOURS PRN
Status: DISCONTINUED | OUTPATIENT
Start: 2022-11-09 | End: 2022-11-10 | Stop reason: HOSPADM

## 2022-11-09 RX ORDER — CYANOCOBALAMIN 1000 UG/ML
1000 INJECTION, SOLUTION INTRAMUSCULAR; SUBCUTANEOUS ONCE
Status: COMPLETED | OUTPATIENT
Start: 2022-11-10 | End: 2022-11-10

## 2022-11-09 RX ORDER — ERGOCALCIFEROL 1.25 MG/1
50000 CAPSULE ORAL WEEKLY
COMMUNITY

## 2022-11-09 RX ORDER — FENTANYL CITRATE 50 UG/ML
50 INJECTION, SOLUTION INTRAMUSCULAR; INTRAVENOUS
Status: DISCONTINUED | OUTPATIENT
Start: 2022-11-09 | End: 2022-11-09 | Stop reason: HOSPADM

## 2022-11-09 RX ORDER — GLYCOPYRROLATE 0.2 MG/ML
INJECTION INTRAMUSCULAR; INTRAVENOUS AS NEEDED
Status: DISCONTINUED | OUTPATIENT
Start: 2022-11-09 | End: 2022-11-09 | Stop reason: SURG

## 2022-11-09 RX ADMIN — SODIUM CHLORIDE, POTASSIUM CHLORIDE, SODIUM LACTATE AND CALCIUM CHLORIDE 150 ML/HR: 600; 310; 30; 20 INJECTION, SOLUTION INTRAVENOUS at 12:00

## 2022-11-09 RX ADMIN — METOCLOPRAMIDE 10 MG: 5 INJECTION, SOLUTION INTRAMUSCULAR; INTRAVENOUS at 08:16

## 2022-11-09 RX ADMIN — FENTANYL CITRATE 100 MCG: 50 INJECTION INTRAMUSCULAR; INTRAVENOUS at 09:40

## 2022-11-09 RX ADMIN — SCOPALAMINE 1 PATCH: 1 PATCH, EXTENDED RELEASE TRANSDERMAL at 08:16

## 2022-11-09 RX ADMIN — SODIUM CHLORIDE, SODIUM LACTATE, POTASSIUM CHLORIDE, AND CALCIUM CHLORIDE: .6; .31; .03; .02 INJECTION, SOLUTION INTRAVENOUS at 10:02

## 2022-11-09 RX ADMIN — CARBAMAZEPINE 200 MG: 200 TABLET ORAL at 20:06

## 2022-11-09 RX ADMIN — HYDROMORPHONE HYDROCHLORIDE 2 MG: 2 TABLET ORAL at 17:45

## 2022-11-09 RX ADMIN — CEFAZOLIN SODIUM 3 G: 10 INJECTION, POWDER, FOR SOLUTION INTRAVENOUS at 09:43

## 2022-11-09 RX ADMIN — CHLORHEXIDINE GLUCONATE 15 ML: 1.2 SOLUTION ORAL at 08:16

## 2022-11-09 RX ADMIN — PANTOPRAZOLE SODIUM 40 MG: 40 INJECTION, POWDER, FOR SOLUTION INTRAVENOUS at 08:16

## 2022-11-09 RX ADMIN — HYDROMORPHONE HYDROCHLORIDE 2 MG: 2 TABLET ORAL at 22:15

## 2022-11-09 RX ADMIN — Medication 10 ML: at 08:16

## 2022-11-09 RX ADMIN — HYDROMORPHONE HYDROCHLORIDE 0.5 MG: 2 INJECTION, SOLUTION INTRAMUSCULAR; INTRAVENOUS; SUBCUTANEOUS at 10:55

## 2022-11-09 RX ADMIN — PROPOFOL 250 MG: 10 INJECTION, EMULSION INTRAVENOUS at 09:40

## 2022-11-09 RX ADMIN — HYDROMORPHONE HYDROCHLORIDE 2 MG: 2 TABLET ORAL at 13:39

## 2022-11-09 RX ADMIN — DEXAMETHASONE SODIUM PHOSPHATE 4 MG: 4 INJECTION, SOLUTION INTRAMUSCULAR; INTRAVENOUS at 09:51

## 2022-11-09 RX ADMIN — GLYCOPYRROLATE 0.6 MG: 0.2 INJECTION INTRAMUSCULAR; INTRAVENOUS at 10:50

## 2022-11-09 RX ADMIN — HYDROMORPHONE HYDROCHLORIDE 0.5 MG: 2 INJECTION, SOLUTION INTRAMUSCULAR; INTRAVENOUS; SUBCUTANEOUS at 10:50

## 2022-11-09 RX ADMIN — ACETAMINOPHEN 1000 MG: 500 TABLET ORAL at 13:38

## 2022-11-09 RX ADMIN — SODIUM CHLORIDE, POTASSIUM CHLORIDE, SODIUM LACTATE AND CALCIUM CHLORIDE 500 ML: 600; 310; 30; 20 INJECTION, SOLUTION INTRAVENOUS at 07:45

## 2022-11-09 RX ADMIN — ACETAMINOPHEN 1000 MG: 500 TABLET ORAL at 20:06

## 2022-11-09 RX ADMIN — HYDROMORPHONE HYDROCHLORIDE 0.5 MG: 2 INJECTION, SOLUTION INTRAMUSCULAR; INTRAVENOUS; SUBCUTANEOUS at 10:40

## 2022-11-09 RX ADMIN — ROCURONIUM BROMIDE 50 MG: 50 INJECTION INTRAVENOUS at 09:40

## 2022-11-09 RX ADMIN — TERAZOSIN HYDROCHLORIDE 1 MG: 1 CAPSULE ORAL at 20:09

## 2022-11-09 RX ADMIN — PROPOFOL INJECTABLE EMULSION 150 MCG/KG/MIN: 10 INJECTION, EMULSION INTRAVENOUS at 09:45

## 2022-11-09 RX ADMIN — SODIUM CHLORIDE, SODIUM LACTATE, POTASSIUM CHLORIDE, AND CALCIUM CHLORIDE: .6; .31; .03; .02 INJECTION, SOLUTION INTRAVENOUS at 09:33

## 2022-11-09 RX ADMIN — HYDROCHLOROTHIAZIDE 25 MG: 25 TABLET ORAL at 17:45

## 2022-11-09 RX ADMIN — PANTOPRAZOLE SODIUM 40 MG: 40 TABLET, DELAYED RELEASE ORAL at 20:06

## 2022-11-09 RX ADMIN — Medication 50 MG: at 09:58

## 2022-11-09 RX ADMIN — MIDAZOLAM HYDROCHLORIDE 2 MG: 1 INJECTION, SOLUTION INTRAMUSCULAR; INTRAVENOUS at 09:33

## 2022-11-09 RX ADMIN — METOPROLOL TARTRATE 100 MG: 50 TABLET, FILM COATED ORAL at 17:45

## 2022-11-09 RX ADMIN — HYDROXYZINE HYDROCHLORIDE 50 MG: 25 TABLET, FILM COATED ORAL at 20:07

## 2022-11-09 RX ADMIN — Medication 3 MG: at 10:50

## 2022-11-09 RX ADMIN — ONDANSETRON 4 MG: 2 INJECTION INTRAMUSCULAR; INTRAVENOUS at 10:51

## 2022-11-09 RX ADMIN — LIDOCAINE HYDROCHLORIDE 40 MG: 20 INJECTION, SOLUTION EPIDURAL; INFILTRATION; INTRACAUDAL; PERINEURAL at 09:33

## 2022-11-09 NOTE — PLAN OF CARE
Goal Outcome Evaluation:  Plan of Care Reviewed With: patient           Outcome Evaluation: Admitted to room from recovery and oriented to room. Explained medication and activity schedule. Will monitor

## 2022-11-09 NOTE — ANESTHESIA PROCEDURE NOTES
Airway  Urgency: elective    Date/Time: 11/9/2022 9:42 AM  Difficult airway (min neck movement, dr rodriguez able to intubate with bougie)    General Information and Staff    Patient location during procedure: OR  Anesthesiologist: Alvino Crum MD  CRNA/CAA: Kenya Huitron CRNA    Indications and Patient Condition  Indications for airway management: airway protection    Preoxygenated: yes  MILS maintained throughout  Mask difficulty assessment: 2 - vent by mask + OA or adjuvant +/- NMBA    Final Airway Details  Final airway type: endotracheal airway      Successful airway: ETT  Cuffed: yes   Successful intubation technique: direct laryngoscopy  Facilitating devices/methods: Bougie, intubating stylet and anterior pressure/BURP  Endotracheal tube insertion site: oral  Blade: Bianca  Blade size: 4  ETT size (mm): 7.5  Cormack-Lehane Classification: grade IIb - view of arytenoids or posterior of glottis only  Placement verified by: chest auscultation and capnometry   Measured from: gums  ETT/EBT to gums (cm): 23  Number of attempts at approach: 2  Assessment: lips, teeth, and gum same as pre-op and atraumatic intubation    Additional Comments  Dr crum intubated pt with bougie

## 2022-11-09 NOTE — ANESTHESIA PREPROCEDURE EVALUATION
Anesthesia Evaluation     Patient summary reviewed and Nursing notes reviewed   NPO Solid Status: > 8 hours  NPO Liquid Status: > 8 hours           Airway   Mallampati: I  TM distance: >3 FB  Neck ROM: full  No difficulty expected  Dental - normal exam     Pulmonary - normal exam   (+) sleep apnea,   Cardiovascular - normal exam    (+) hypertension,       Neuro/Psych  (+) headaches, psychiatric history Bipolar,    GI/Hepatic/Renal/Endo    (+) morbid obesity, GERD,  thyroid problem hypothyroidism    Musculoskeletal (-) negative ROS    Abdominal  - normal exam    Bowel sounds: normal.   Substance History - negative use     OB/GYN negative ob/gyn ROS         Other                        Anesthesia Plan    ASA 3     general and ERAS Protocol   total IV anesthesia  intravenous induction     Anesthetic plan, risks, benefits, and alternatives have been provided, discussed and informed consent has been obtained with: patient.    Plan discussed with CRNA and CAA.        CODE STATUS:

## 2022-11-09 NOTE — OP NOTE
PREOPERATIVE DIAGNOSIS:  Morbid obesity with multiple comorbidities as referenced in the most recent history and physical. Body mass index is 43.24 kg/m².      POSTOPERATIVE DIAGNOSIS:  Morbid obesity with multiple comorbidities as referenced in the most recent history and physical.Body mass index is 43.24 kg/m².      PROCEDURES PERFORMED:  1.  Robotic assisted laparoscopic sleeve gastrectomy   SURGEON:  Ryann Santos MD FACS, SHIRLEYBS    ASSISTANT:  Assistant: David Burdick CSA    Surgery assisted and facilitated by a certified assistant, who directly resulted in a decreased operative time, anesthetic time, wound exposure, and possibly of an operative wound infection, thereby decreasing patient morbidity and ultimately total expenditures.  The surgical assistant assisted in placement of trochars, take down of the gastrocolic omentum, short gastric vessels and dissection at the angle of His.  Also assisted in retraction of the stomach during stapling so as not to kink the gastric sleeve.  Also assisted in removing of the gastric specimen, closure of the fascial defect as well as closure of the skin incisions.    ANESTHESIA:  General endotracheal.    ESTIMATED BLOOD LOSS:   Less than 25 mL unless dictated below.    FLUIDS:  Crystalloids.    SPECIMENS:  Gastric remnant    DRAINS:  None.    Implant Name Type Inv. Item Serial No.  Lot No. LRB No. Used Action   RELOAD STPLR SUREFORM 60 DAVINCI/X/XI 6ROW 2.5 WHT 1P/U - [id number] Implant RELOAD STPLR SUREFORM 60 DAVINCI/X/XI 6ROW 2.5 WHT 1P/U  [employer] [id number] N/A 1 Implanted   RELOAD STPLR SUREFORM 60 DAVINCI/X/XI 6ROW 3.5 NICOLETTE 1P/U - [id number] Implant RELOAD STPLR SUREFORM 60 DAVINCI/X/XI 6ROW 3.5 NICOLETTE 1P/U  INTUITIVE SURGICAL [id number] N/A 3 Implanted   SEAL FIBRIN TISSEEL FZ 4ML - [id number] Implant SEAL FIBRIN TISSEEL FZ 4ML  [employer] [id number] N/A 1 Implanted   RELOAD STPLR SUREFORM 60 DAVINCI/X/XI 6ROW 2.5 WHT 1P/U - [id number] Implant  RELOAD STPLR SUREFORM 60 DAVINCI/X/XI 6ROW 2.5 WHT 1P/U  INTUITIVE SURGICAL [id number] N/A 2 Implanted   RELOAD STPLR SUREFORM 60 DAVINCI/X/XI 6ROW 3.5 NICOLETTE 1P/U - [id number] Implant RELOAD STPLR SUREFORM 60 DAVINCI/X/XI 6ROW 3.5 NICOLETTE 1P/U  INTUITIVE SURGICAL [id number] N/A 1 Implanted       COUNTS:  Correct.    COMPLICATIONS:  None.    INDICATIONS:  This patient with morbid obesity and associated comorbidities presents for elective laparoscopic, possible open sleeve gastrectomy.  The patient has received medical clearance to proceed.  The patient has undergone our extensive educational process and consent process and wishes to proceed.        DESCRIPTION OF PROCEDURE:  The patient was brought to the operating room and placed supine upon the operating room table. SCD hose were placed.  The patient underwent uneventful general endotracheal anesthesia per the anesthesiology staff. The abdomen was prepped with ChloraPrep and draped in the usual sterile fashion.  An Ioban was used as well if not allergic.  Depending on the technique to size the gastric sleeve, anesthesia staff either passed a 40-Croatian ViSiGi bougie into the stomach to decompress and then was pulled back to the esophagus.      An 8 mm transverse incision was made at the left midclavicular line about 15 cm inferior to the xiphoid.  The peritoneal cavity was entered under direct camera visualization using a 5  mm 0° laparoscope and an Optiview trocar.  The abdomen was then insufflated to a pressure of 15-16 mmHg with CO2 gas.  Exploratory laparoscopy revealed no evidence of injury from the entrance technique and no significant abnormalities unless addendum dictated below.    The patient was placed in reverse Trendelenburg position.   A 12 mm trocar was placed in the right abdomen.  I then changed the scope to a 30 degree da Ciro scope.  Under direct camera visualization two 8 mm trocar was placed in the left lateral midabdominal position.  ABIOLA Manrique  retractor was placed through an epigastric incision and used to elevate the left lobe of the liver.         Next the 30 degree 8 mm scope was brought into the 8 mm port just to the left of the umbilicus and the corresponding trocar was docked to the da Ciro robot.  Targeting then took place and then the rest of the trochars were docked to the robot and angled into position.  Instruments were brought in through the trochars in place and into view.          I then took control of the surgical console. The gastrocolic omentum was divided with the Vessel Sealer and this proceeded superiorly to the angle of His taking down the short gastric vessels.  All posterior attachments of the lesser sac and posterior aspect of the stomach to the pancreas were taken down as well.          Dissection then proceeded medially taking down the greater curvature with the vessel sealer to just proximal to the pylorus.  The 40-Uzbek orogastric tube was passed back down into the stomach for decompression and later to aid in sizing the sleeve.       I then used the Surefire stapler and a blue load was used to create the gastric sleeve.   There were additional firings to complete the gastric sleeve near the angle of Hiss, see above.  ICG was used for a leak test by insufflating ICG into the orogastric tube and the staple line was closely inspected under firefly and there was no evidence of leak.    Tisseal was used on the staple line.The specimen was removed through the 12 mm port site.    The liver retractor was removed. The fascia at the 12 mm trocar site incision that was used for extraction was closed with a single 0 Vicryl suture in a figure-of-eight fashion placed under direct laparoscopic camera visualization with a suture passer and tying the knot extracorporeally.  The fascia in the area was infiltrated with local anesthesia. All incisions were then infiltrated with local anesthetic. The remaining trocars were removed under direct  camera visualization with no bleeding noted from their sites.  The abdomen was desufflated of gas. The skin in each incision was closed using 4-0 antibiotic impregnated Monocryl in a subcuticular fashion followed by Dermabond.  The patient tolerated the procedure well without complication and was taken to the recovery room in stable condition.  All sponge, needle and instrument counts were correct.

## 2022-11-10 VITALS
WEIGHT: 292.8 LBS | TEMPERATURE: 98 F | RESPIRATION RATE: 18 BRPM | HEIGHT: 69 IN | OXYGEN SATURATION: 93 % | SYSTOLIC BLOOD PRESSURE: 146 MMHG | HEART RATE: 66 BPM | DIASTOLIC BLOOD PRESSURE: 87 MMHG | BODY MASS INDEX: 43.37 KG/M2

## 2022-11-10 LAB
LAB AP CASE REPORT: NORMAL
PATH REPORT.FINAL DX SPEC: NORMAL
PATH REPORT.GROSS SPEC: NORMAL

## 2022-11-10 PROCEDURE — 25010000002 THIAMINE PER 100 MG: Performed by: SURGERY

## 2022-11-10 PROCEDURE — 99024 POSTOP FOLLOW-UP VISIT: CPT | Performed by: SURGERY

## 2022-11-10 PROCEDURE — 25010000002 ENOXAPARIN PER 10 MG: Performed by: SURGERY

## 2022-11-10 PROCEDURE — 25010000002 CYANOCOBALAMIN PER 1000 MCG: Performed by: SURGERY

## 2022-11-10 RX ORDER — HYDROMORPHONE HYDROCHLORIDE 2 MG/1
2 TABLET ORAL EVERY 6 HOURS PRN
Qty: 18 TABLET | Refills: 0 | Status: SHIPPED | OUTPATIENT
Start: 2022-11-10 | End: 2022-11-14

## 2022-11-10 RX ORDER — URSODIOL 250 MG/1
250 TABLET, FILM COATED ORAL 2 TIMES DAILY
Qty: 60 TABLET | Refills: 5 | Status: SHIPPED | OUTPATIENT
Start: 2022-11-10 | End: 2022-12-10

## 2022-11-10 RX ORDER — ENOXAPARIN SODIUM 100 MG/ML
40 INJECTION SUBCUTANEOUS 2 TIMES DAILY
Status: DISCONTINUED | OUTPATIENT
Start: 2022-11-10 | End: 2022-11-10 | Stop reason: HOSPADM

## 2022-11-10 RX ORDER — URSODIOL 250 MG/1
250 TABLET, FILM COATED ORAL 2 TIMES DAILY
Qty: 60 TABLET | Refills: 5 | Status: SHIPPED | OUTPATIENT
Start: 2022-11-10 | End: 2022-11-10 | Stop reason: SDUPTHER

## 2022-11-10 RX ORDER — ENOXAPARIN SODIUM 100 MG/ML
40 INJECTION SUBCUTANEOUS
Qty: 5.6 ML | Refills: 0 | Status: SHIPPED | OUTPATIENT
Start: 2022-11-10 | End: 2022-11-10 | Stop reason: HOSPADM

## 2022-11-10 RX ORDER — ONDANSETRON 8 MG/1
8 TABLET, ORALLY DISINTEGRATING ORAL EVERY 8 HOURS PRN
Qty: 30 TABLET | Refills: 5 | Status: SHIPPED | OUTPATIENT
Start: 2022-11-10

## 2022-11-10 RX ORDER — ONDANSETRON 8 MG/1
8 TABLET, ORALLY DISINTEGRATING ORAL EVERY 8 HOURS PRN
Qty: 30 TABLET | Refills: 5 | Status: SHIPPED | OUTPATIENT
Start: 2022-11-10 | End: 2022-11-10 | Stop reason: SDUPTHER

## 2022-11-10 RX ADMIN — CARBAMAZEPINE 100 MG: 200 TABLET ORAL at 04:37

## 2022-11-10 RX ADMIN — ACETAMINOPHEN 1000 MG: 500 TABLET ORAL at 08:10

## 2022-11-10 RX ADMIN — CYANOCOBALAMIN 1000 MCG: 1000 INJECTION, SOLUTION INTRAMUSCULAR; SUBCUTANEOUS at 08:10

## 2022-11-10 RX ADMIN — ENOXAPARIN SODIUM 40 MG: 100 INJECTION SUBCUTANEOUS at 08:17

## 2022-11-10 RX ADMIN — FLUOXETINE 80 MG: 20 CAPSULE ORAL at 08:10

## 2022-11-10 RX ADMIN — PANTOPRAZOLE SODIUM 40 MG: 40 TABLET, DELAYED RELEASE ORAL at 08:11

## 2022-11-10 RX ADMIN — THIAMINE HYDROCHLORIDE 100 ML/HR: 100 INJECTION, SOLUTION INTRAMUSCULAR; INTRAVENOUS at 01:55

## 2022-11-10 RX ADMIN — HYDROCHLOROTHIAZIDE 25 MG: 25 TABLET ORAL at 08:11

## 2022-11-10 RX ADMIN — HYDROMORPHONE HYDROCHLORIDE 2 MG: 2 TABLET ORAL at 04:36

## 2022-11-10 RX ADMIN — BUPROPION HYDROCHLORIDE 100 MG: 100 TABLET, FILM COATED ORAL at 04:37

## 2022-11-10 RX ADMIN — LEVOTHYROXINE SODIUM 50 MCG: 50 TABLET ORAL at 08:10

## 2022-11-10 RX ADMIN — ACETAMINOPHEN 1000 MG: 500 TABLET ORAL at 01:55

## 2022-11-10 RX ADMIN — METOPROLOL TARTRATE 100 MG: 50 TABLET, FILM COATED ORAL at 08:11

## 2022-11-10 NOTE — CASE MANAGEMENT/SOCIAL WORK
Case Management Discharge Note      Final Note: home    Provided Post Acute Provider List?: N/A  N/A Provider List Comment: denies dc needs    Selected Continued Care - Discharged on 11/10/2022 Admission date: 11/9/2022 - Discharge disposition: Home or Self Care             Transportation Services  Private: Car    Final Discharge Disposition Code: 01 - home or self-care

## 2022-11-10 NOTE — PLAN OF CARE
Goal Outcome Evaluation:  Plan of Care Reviewed With: patient           Outcome Evaluation: Currently abed at this time. Has been up ambulating in the seals during the shift. Has not had any complaints of nausea or pain that has required pain medication so far during the shift. Plan is to discharge home today

## 2022-11-10 NOTE — DISCHARGE SUMMARY
"Discharge Summary    Patient name: Vikash Mccarty    Medical record number: [id number]    Admission date: 11/9/2022  Discharge date:      Attending physician: Dr. Ryann Santos    Primary care physician: Romel Cooper MD    Referring physician: Ryann Santos MD  [address],  [place] [address]    Condition on discharge: Stable    Primary Diagnoses:  Morbid obesity with co-morbidities    Operative Procedure: Robotic Laparoscopic sleeve gastrectomy     Vikash Mccarty  is post op day one status post procedure listed. Patient denies shortness of air and lower extremity pain. Feels better than yesterday. No vomiting this am. Ambulating well and using incentive spirometer.          /87 (BP Location: Right arm, Patient Position: Lying)   Pulse 66   Temp 98 °F (36.7 °C) (Oral)   Resp 18   Ht 175.3 cm (69\")   Wt 133 kg (292 lb 12.8 oz)   SpO2 93%   BMI 43.24 kg/m²     General:  alert, appears stated age and cooperative   Abdomen: soft, bowel sounds active, appropriate tenderness   Incision:   healing well, no drainage, no erythema, no hernia, no seroma, no swelling, no dehiscence, incision well approximated   Heart: Regular rate   Lungs: Clear to auscultation bilaterally     I reviewed the patient's new clinical results.     Lab Results (last 24 hours)     Procedure Component Value Units Date/Time    Comprehensive Metabolic Panel [[id number]]  (Abnormal) Collected: 11/09/22 1907    Specimen: Blood Updated: 11/09/22 1949     Glucose 116 mg/dL      BUN 19 mg/dL      Creatinine 1.29 mg/dL      Sodium 136 mmol/L      Potassium 4.5 mmol/L      Chloride 99 mmol/L      CO2 27.0 mmol/L      Calcium 9.0 mg/dL      Total Protein 7.0 g/dL      Albumin 4.60 g/dL      ALT (SGPT) 41 U/L      AST (SGOT) 29 U/L      Alkaline Phosphatase 100 U/L      Total Bilirubin 0.4 mg/dL      Globulin 2.4 gm/dL      A/G Ratio 1.9 g/dL      BUN/Creatinine Ratio 14.7     Anion Gap 10.0 mmol/L      eGFR 69.7 mL/min/1.73     "  Comment: National Kidney Foundation and American Society of Nephrology (ASN) Task Force recommended calculation based on the Chronic Kidney Disease Epidemiology Collaboration (CKD-EPI) equation refit without adjustment for race.       Narrative:      GFR Normal >60  Chronic Kidney Disease <60  Kidney Failure <15      Phosphorus [[id number]]  (Normal) Collected: 11/09/22 1907    Specimen: Blood Updated: 11/09/22 1949     Phosphorus 3.6 mg/dL     Magnesium [[id number]]  (Normal) Collected: 11/09/22 1907    Specimen: Blood Updated: 11/09/22 1949     Magnesium 1.9 mg/dL     CBC & Differential [[id number]]  (Abnormal) Collected: 11/09/22 1907    Specimen: Blood Updated: 11/09/22 1929    Narrative:      The following orders were created for panel order CBC & Differential.  Procedure                               Abnormality         Status                     ---------                               -----------         ------                     CBC Auto Differential[[id number]]        Abnormal            Final result                 Please view results for these tests on the individual orders.    CBC Auto Differential [[id number]]  (Abnormal) Collected: 11/09/22 1907    Specimen: Blood Updated: 11/09/22 1929     WBC 11.60 10*3/mm3      RBC 4.61 10*6/mm3      Hemoglobin 13.5 g/dL      Hematocrit 39.5 %      MCV 85.6 fL      MCH 29.2 pg      MCHC 34.1 g/dL      RDW 13.4 %      RDW-SD 42.4 fl      MPV 7.4 fL      Platelets 220 10*3/mm3      Neutrophil % 85.3 %      Lymphocyte % 11.2 %      Monocyte % 3.0 %      Eosinophil % 0.1 %      Basophil % 0.4 %      Neutrophils, Absolute 9.90 10*3/mm3      Lymphocytes, Absolute 1.30 10*3/mm3      Monocytes, Absolute 0.40 10*3/mm3      Eosinophils, Absolute 0.00 10*3/mm3      Basophils, Absolute 0.10 10*3/mm3      nRBC 0.0 /100 WBC     Tissue Pathology Exam [[id number]] Collected: 11/09/22 1030    Specimen: Tissue from Stomach, Greater curvature Updated: 11/09/22 1325             Assessment:       Doing well postoperatively.      Plan:   1. Continue Stage 1 diet  2. Continue with ambulation and Incentive spirometry  3. Plan for d/c home      Hospital Course: The patient is a very pleasant 47 y.o. male that was admitted to the hospital with with morbid obesity underwent a laparoscopic gastric sleeve.  The next morning the patient was able to tolerate liquids and was ambulating and vital signs and labs were stable.  The patient is discharged home with follow-up in my office next week.      Discharge medications:      Discharge Medications      New Medications      Instructions Start Date   HYDROmorphone 2 MG tablet  Commonly known as: Dilaudid   2 mg, Oral, Every 6 Hours PRN      ondansetron ODT 8 MG disintegrating tablet  Commonly known as: Zofran ODT   8 mg, Translingual, Every 8 Hours PRN      ursodiol 250 MG tablet  Commonly known as: ACTIGALL   250 mg, Oral, 2 Times Daily         Continue These Medications      Instructions Start Date   aspirin 81 MG chewable tablet   81 mg, Oral, Daily, Hold ASA      atorvastatin 80 MG tablet  Commonly known as: LIPITOR   80 mg, Oral, Nightly      buPROPion 100 MG tablet  Commonly known as: WELLBUTRIN   100 mg, Oral, Every Early Morning, Take dos      carBAMazepine 200 MG tablet  Commonly known as: TEGretol   100 mg, Oral, Every Early Morning, Take dos      carBAMazepine 200 MG tablet  Commonly known as: TEGretol   200 mg, Oral, Every Night at Bedtime      ezetimibe 10 MG tablet  Commonly known as: ZETIA   10 mg, Oral, Every Night at Bedtime      FLUoxetine 20 MG capsule  Commonly known as: PROzac   80 mg, Oral, Every Early Morning, Take dos      hydroCHLOROthiazide 25 MG tablet  Commonly known as: HYDRODIURIL   25 mg, Oral, Daily      hydrOXYzine 50 MG tablet  Commonly known as: ATARAX   50 mg, Oral, Every Night at Bedtime      levothyroxine 50 MCG tablet  Commonly known as: SYNTHROID, LEVOTHROID   50 mcg, Oral, Daily, Take dos      metoprolol tartrate 100 MG  tablet  Commonly known as: LOPRESSOR   100 mg, Oral, 2 Times Daily, Take dos      pantoprazole 40 MG EC tablet  Commonly known as: PROTONIX   40 mg, Oral, 2 Times Daily, Take dos      prazosin 1 MG capsule  Commonly known as: MINIPRESS   1 mg, Oral, Nightly      vitamin D 1.25 MG (07811 UT) capsule capsule  Commonly known as: ERGOCALCIFEROL   50,000 Units, Oral, Weekly             Discharge instructions:  Per Bariatric manual; per our protocol      Follow-up appointment: Follow up with Dr. Santos in the office as scheduled.  If not already scheduled call for appointment at [phone]

## 2022-11-10 NOTE — PLAN OF CARE
Goal Outcome Evaluation:           Progress: improving       Patient is alert and oriented, VSS. He has been up out of bed and tolerated ambulating well. He has appeared to rest well through the night. Care plan is on going.

## 2022-11-10 NOTE — ANESTHESIA POSTPROCEDURE EVALUATION
Patient: Vikash Mccarty    Procedure Summary     Date: 11/09/22 Room / Location: [facility] OR 09 / [facility] MAIN OR    Anesthesia Start: 0933 Anesthesia Stop: 1118    Procedure: GASTRIC SLEEVE LAPAROSCOPIC WITH DAVINCI ROBOT (Abdomen) Diagnosis:       Obesity, Class III, BMI 40-49.9 (morbid obesity) ([facility])      (Obesity, Class III, BMI 40-49.9 (morbid obesity) ([facility]) [E66.01])    Surgeons: Ryann Santos MD Provider: Alvino Crum MD    Anesthesia Type: general, ERAS Protocol ASA Status: 3          Anesthesia Type: general, ERAS Protocol    Vitals  Vitals Value Taken Time   /76 11/09/22 1226   Temp 97.5 °F (36.4 °C) 11/09/22 1226   Pulse 72 11/09/22 1226   Resp 12 11/09/22 1226   SpO2 96 % 11/09/22 1226           Post Anesthesia Care and Evaluation    Patient location during evaluation: PACU  Patient participation: complete - patient participated  Level of consciousness: awake  Pain scale: See nurse's notes for pain score.  Pain management: adequate    Airway patency: patent  Anesthetic complications: No anesthetic complications  PONV Status: none  Cardiovascular status: acceptable  Respiratory status: acceptable  Hydration status: acceptable    Comments: Patient seen and examined postoperatively; vital signs stable; SpO2 greater than or equal to 90%; cardiopulmonary status stable; nausea/vomiting adequately controlled; pain adequately controlled; no apparent anesthesia complications; patient discharged from anesthesia care when discharge criteria were met

## 2022-11-10 NOTE — CASE MANAGEMENT/SOCIAL WORK
Discharge Planning Assessment   [place]     Patient Name: Vikash Mccarty  MRN: [id number]  Today's Date: 11/10/2022    Admit Date: 11/9/2022    Plan: Home   Discharge Needs Assessment     Row Name 11/10/22 1223       Living Environment    People in Home alone    Current Living Arrangements home    Primary Care Provided by self    Provides Primary Care For no one    Family Caregiver if Needed none    Able to Return to Prior Arrangements yes       Resource/Environmental Concerns    Resource/Environmental Concerns none    Transportation Concerns none       Transition Planning    Patient/Family Anticipates Transition to home    Patient/Family Anticipated Services at Transition none    Transportation Anticipated family or friend will provide       Discharge Needs Assessment    Readmission Within the Last 30 Days no previous admission in last 30 days    Equipment Currently Used at Home none    Anticipated Changes Related to Illness none    Equipment Needed After Discharge none    Provided Post Acute Provider List? N/A    N/A Provider List Comment denies dc needs               Discharge Plan     Row Name 11/10/22 1223       Plan    Plan Home    Patient/Family in Agreement with Plan yes    Plan Comments Spoke with patient. Confirmed pcp and pharmacy.  Family will transport patient at discharge.  Denies dc needs              Continued Care and Services - Admitted Since 11/9/2022    Coordination has not been started for this encounter.       Expected Discharge Date and Time     Expected Discharge Date Expected Discharge Time    Nov 10, 2022          Demographic Summary     Row Name 11/10/22 1222       General Information    Admission Type inpatient    Arrived From home    Referral Source admission list    Reason for Consult discharge planning    Preferred Language English       Contact Information    Permission Granted to Share Info With                Functional Status     Row Name 11/10/22 1223        Functional Status    Usual Activity Tolerance good    Current Activity Tolerance good       Functional Status, IADL    Medications independent    Meal Preparation independent    Housekeeping independent    Laundry independent    Shopping independent               Rosa Lopez RN   Phone communication or documentation only - no physical contact with patient or family.

## 2022-11-14 ENCOUNTER — OFFICE VISIT (OUTPATIENT)
Dept: BARIATRICS/WEIGHT MGMT | Facility: CLINIC | Age: 47
End: 2022-11-14

## 2022-11-14 VITALS
OXYGEN SATURATION: 98 % | TEMPERATURE: 98.1 F | HEIGHT: 69 IN | SYSTOLIC BLOOD PRESSURE: 128 MMHG | RESPIRATION RATE: 16 BRPM | BODY MASS INDEX: 42.24 KG/M2 | HEART RATE: 78 BPM | WEIGHT: 285.2 LBS | DIASTOLIC BLOOD PRESSURE: 86 MMHG

## 2022-11-14 DIAGNOSIS — E66.01 OBESITY, CLASS III, BMI 40-49.9 (MORBID OBESITY): Primary | ICD-10-CM

## 2022-11-14 PROCEDURE — 99024 POSTOP FOLLOW-UP VISIT: CPT | Performed by: SURGERY

## 2022-11-14 NOTE — PROGRESS NOTES
[facility] SURGERY  [address] [place] [address]  [address] [place] [address]  Dept: [phone]  11/14/2022      Vikash JIMENEZ Myas.  [id number]  [id number]  1975  male      Chief Complaint   Patient presents with   • Post-op     1 wk sleeve     Gastric Sleeve Laparoscopic With Davinci Robot  11/9/2022    BH Post-Op Bariatric Surgery:     HPI:   Weight loss in the last week:    Wt Readings from Last 10 Encounters:   11/14/22 129 kg (285 lb 3.2 oz)   11/09/22 133 kg (292 lb 12.8 oz)   11/04/22 135 kg (298 lb 6.4 oz)   01/27/22 (!) 144 kg (316 lb 12.8 oz)   01/14/20 (!) 155 kg (342 lb 3.2 oz)   10/22/19 (!) 154 kg (338 lb 8 oz)       Review of Systems  No dysphagia no nausea no vomiting  No shortness of breath no chest pain  No abdominal pain  No extremity pain or swelling    Patient Active Problem List   Diagnosis   • Class 3 severe obesity with body mass index (BMI) of 45.0 to 49.9 in adult ([facility])   • Essential hypertension   • Chronic fatigue   • Insomnia   • Hyperlipidemia   • Sleep apnea   • GERD (gastroesophageal reflux disease)   • Depression with anxiety   • Bipolar 1 disorder ([facility])   • Hypothyroidism   • Class 3 severe obesity with serious comorbidity and body mass index (BMI) of 45.0 to 49.9 in adult ([facility])   • Gastroesophageal reflux disease   • Obstructive sleep apnea syndrome   • Obesity, Class III, BMI 40-49.9 (morbid obesity) ([facility])       The following portions of the patient's history were reviewed and updated as appropriate: allergies, current medications, past family history, past medical history, past social history, past surgical history, and problem list.    Vitals:    11/14/22 1049   BP: 128/86   Pulse: 78   Resp: 16   Temp: 98.1 °F (36.7 °C)   SpO2: 98%       Physical Exam  Awake and alert  Normal pulmonary effort  Incisions with no erythema and appropriate abdominal tenderness  Extremities with no  tenderness and no swelling    Assessment:   Patient Active Problem List   Diagnosis   • Class 3 severe obesity with body mass index (BMI) of 45.0 to 49.9 in adult ([facility])   • Essential hypertension   • Chronic fatigue   • Insomnia   • Hyperlipidemia   • Sleep apnea   • GERD (gastroesophageal reflux disease)   • Depression with anxiety   • Bipolar 1 disorder ([facility])   • Hypothyroidism   • Class 3 severe obesity with serious comorbidity and body mass index (BMI) of 45.0 to 49.9 in adult ([facility])   • Gastroesophageal reflux disease   • Obstructive sleep apnea syndrome   • Obesity, Class III, BMI 40-49.9 (morbid obesity) ([facility])       Post-op, the patient is doing well.     Plan:   Reviewed with patient the importance of following the manual for diet progression. Increase activity as tolerated. Continue increasing daily intake of protein and water.   Return to work: the patient is to return to 3 weeks from their surgery date with no restrictions unless they develop medical problems in which we will see them back in the office. They received a note in our office today with their return to work date.  Activity restrictions: no lifting, pushing or pulling over 25lbs for 3 weeks.   Recommended patient be sure to get at least 70 grams of protein per day. Discussed with the patient the recommended amount of water per day to intake. Reviewed vitamin requirements. Be sure to do routine exercise and increase activity as tolerated. No asa, nsaids or steroids for 8 weeks. Patient may use miralax as needed if necessary.     Instructions / Recommendations: dietary counseling recommended, recommended a daily protein intake of  grams, vitamin supplement(s) recommended, recommended exercising at least 150 minutes per week, behavior modifications recommended and instructed to call the office for concerns, questions, or problems.     The patient was instructed to follow up at one month follow up appt.     The patient was counseled regarding  post op bariatric manual  Answers for HPI/ROS submitted by the patient on 11/7/2022  What is the primary reason for your visit?: Other  Please describe your symptoms.: Na  Have you had these symptoms before?: No  How long have you been having these symptoms?: 1-4 days  Please list any medications you are currently taking for this condition.: You have  Please describe any probable cause for these symptoms. : Na

## 2022-12-15 ENCOUNTER — OFFICE VISIT (OUTPATIENT)
Dept: BARIATRICS/WEIGHT MGMT | Facility: CLINIC | Age: 47
End: 2022-12-15

## 2022-12-15 VITALS
WEIGHT: 262 LBS | HEART RATE: 78 BPM | OXYGEN SATURATION: 96 % | BODY MASS INDEX: 38.8 KG/M2 | HEIGHT: 69 IN | DIASTOLIC BLOOD PRESSURE: 90 MMHG | SYSTOLIC BLOOD PRESSURE: 142 MMHG

## 2022-12-15 DIAGNOSIS — E66.9 OBESITY, CLASS II, BMI 35-39.9: Primary | ICD-10-CM

## 2022-12-15 PROCEDURE — 99024 POSTOP FOLLOW-UP VISIT: CPT | Performed by: NURSE PRACTITIONER

## 2022-12-15 RX ORDER — URSODIOL 300 MG/1
300 CAPSULE ORAL 2 TIMES DAILY
Qty: 180 CAPSULE | Refills: 1 | Status: SHIPPED | OUTPATIENT
Start: 2022-12-15

## 2022-12-15 RX ORDER — URSODIOL 300 MG/1
300 CAPSULE ORAL 2 TIMES DAILY
COMMUNITY
End: 2022-12-15

## 2022-12-29 ENCOUNTER — LAB (OUTPATIENT)
Dept: LAB | Facility: HOSPITAL | Age: 47
End: 2022-12-29
Payer: OTHER GOVERNMENT

## 2022-12-29 ENCOUNTER — TRANSCRIBE ORDERS (OUTPATIENT)
Dept: ADMINISTRATIVE | Facility: HOSPITAL | Age: 47
End: 2022-12-29
Payer: OTHER GOVERNMENT

## 2022-12-29 ENCOUNTER — APPOINTMENT (OUTPATIENT)
Dept: CARDIOLOGY | Facility: HOSPITAL | Age: 47
End: 2022-12-29
Payer: OTHER GOVERNMENT

## 2022-12-29 ENCOUNTER — HOSPITAL ENCOUNTER (OUTPATIENT)
Dept: GENERAL RADIOLOGY | Facility: HOSPITAL | Age: 47
Discharge: HOME | End: 2022-12-29
Payer: OTHER GOVERNMENT

## 2022-12-29 ENCOUNTER — TRANSCRIBE ORDERS (OUTPATIENT)
Dept: ADMINISTRATIVE | Facility: HOSPITAL | Age: 47
End: 2022-12-29

## 2022-12-29 DIAGNOSIS — Z01.818 PRE-OP TESTING: ICD-10-CM

## 2022-12-29 DIAGNOSIS — Z01.818 PRE-OP TESTING: Primary | ICD-10-CM

## 2022-12-29 DIAGNOSIS — E66.01 OBESITY, CLASS III, BMI 40-49.9 (MORBID OBESITY): ICD-10-CM

## 2022-12-29 LAB
ALBUMIN SERPL-MCNC: 4.6 G/DL (ref 3.5–5.2)
ALBUMIN/GLOB SERPL: 1.5 G/DL
ALP SERPL-CCNC: 102 U/L (ref 39–117)
ALT SERPL W P-5'-P-CCNC: 44 U/L (ref 1–41)
ANION GAP SERPL CALCULATED.3IONS-SCNC: 10.7 MMOL/L (ref 5–15)
APTT PPP: 26.4 SECONDS (ref 24–31)
AST SERPL-CCNC: 29 U/L (ref 1–40)
BASOPHILS # BLD AUTO: 0 10*3/MM3 (ref 0–0.2)
BASOPHILS NFR BLD AUTO: 0.8 % (ref 0–1.5)
BILIRUB SERPL-MCNC: 0.5 MG/DL (ref 0–1.2)
BUN SERPL-MCNC: 15 MG/DL (ref 6–20)
BUN/CREAT SERPL: 9.7 (ref 7–25)
CALCIUM SPEC-SCNC: 9.7 MG/DL (ref 8.6–10.5)
CHLORIDE SERPL-SCNC: 97 MMOL/L (ref 98–107)
CO2 SERPL-SCNC: 31.3 MMOL/L (ref 22–29)
CREAT SERPL-MCNC: 1.54 MG/DL (ref 0.76–1.27)
DEPRECATED RDW RBC AUTO: 46.4 FL (ref 37–54)
EGFRCR SERPLBLD CKD-EPI 2021: 56.3 ML/MIN/1.73
EOSINOPHIL # BLD AUTO: 0.3 10*3/MM3 (ref 0–0.4)
EOSINOPHIL NFR BLD AUTO: 4.8 % (ref 0.3–6.2)
ERYTHROCYTE [DISTWIDTH] IN BLOOD BY AUTOMATED COUNT: 14.8 % (ref 12.3–15.4)
GLOBULIN UR ELPH-MCNC: 3.1 GM/DL
GLUCOSE SERPL-MCNC: 130 MG/DL (ref 65–99)
HCT VFR BLD AUTO: 43.5 % (ref 37.5–51)
HGB BLD-MCNC: 14.3 G/DL (ref 13–17.7)
INR PPP: 1.03 (ref 0.93–1.1)
IRON 24H UR-MRATE: 107 MCG/DL (ref 59–158)
LYMPHOCYTES # BLD AUTO: 2 10*3/MM3 (ref 0.7–3.1)
LYMPHOCYTES NFR BLD AUTO: 33.8 % (ref 19.6–45.3)
MCH RBC QN AUTO: 29.3 PG (ref 26.6–33)
MCHC RBC AUTO-ENTMCNC: 32.9 G/DL (ref 31.5–35.7)
MCV RBC AUTO: 89 FL (ref 79–97)
MONOCYTES # BLD AUTO: 0.3 10*3/MM3 (ref 0.1–0.9)
MONOCYTES NFR BLD AUTO: 5.5 % (ref 5–12)
NEUTROPHILS NFR BLD AUTO: 3.3 10*3/MM3 (ref 1.7–7)
NEUTROPHILS NFR BLD AUTO: 55.1 % (ref 42.7–76)
NRBC BLD AUTO-RTO: 0.1 /100 WBC (ref 0–0.2)
PLATELET # BLD AUTO: 264 10*3/MM3 (ref 140–450)
PMV BLD AUTO: 8.7 FL (ref 6–12)
POTASSIUM SERPL-SCNC: 3.2 MMOL/L (ref 3.5–5.2)
PREALB SERPL-MCNC: 38.6 MG/DL (ref 20–40)
PROT SERPL-MCNC: 7.7 G/DL (ref 6–8.5)
PROTHROMBIN TIME: 10.6 SECONDS (ref 9.6–11.7)
RBC # BLD AUTO: 4.89 10*6/MM3 (ref 4.14–5.8)
SODIUM SERPL-SCNC: 139 MMOL/L (ref 136–145)
WBC NRBC COR # BLD: 5.9 10*3/MM3 (ref 3.4–10.8)

## 2022-12-29 PROCEDURE — 80053 COMPREHEN METABOLIC PANEL: CPT

## 2022-12-29 PROCEDURE — 36415 COLL VENOUS BLD VENIPUNCTURE: CPT

## 2022-12-29 PROCEDURE — 85730 THROMBOPLASTIN TIME PARTIAL: CPT

## 2022-12-29 PROCEDURE — 83540 ASSAY OF IRON: CPT

## 2022-12-29 PROCEDURE — 85025 COMPLETE CBC W/AUTO DIFF WBC: CPT

## 2022-12-29 PROCEDURE — 83921 ORGANIC ACID SINGLE QUANT: CPT

## 2022-12-29 PROCEDURE — 71045 X-RAY EXAM CHEST 1 VIEW: CPT

## 2022-12-29 PROCEDURE — 84134 ASSAY OF PREALBUMIN: CPT

## 2022-12-29 PROCEDURE — 84425 ASSAY OF VITAMIN B-1: CPT

## 2022-12-29 PROCEDURE — 85610 PROTHROMBIN TIME: CPT

## 2023-01-01 LAB — METHYLMALONATE SERPL-SCNC: 297 NMOL/L (ref 0–378)

## 2023-01-04 LAB — VIT B1 BLD-SCNC: 263.5 NMOL/L (ref 66.5–200)

## 2023-02-06 ENCOUNTER — TELEPHONE (OUTPATIENT)
Dept: BARIATRICS/WEIGHT MGMT | Facility: CLINIC | Age: 48
End: 2023-02-06
Payer: OTHER GOVERNMENT

## 2023-02-06 NOTE — TELEPHONE ENCOUNTER
Patient is having trouble with both liquids and solids.  I have him scheduled for Wednesday @ 11am. [place]

## 2023-02-06 NOTE — TELEPHONE ENCOUNTER
Vikash states that he feels like his food is getting stuck and it keeps him from eating.  He is scheduled for 2/17.  Is it ok to work him in sooner or do you want him to see Dr. Santos?  Please advise

## 2023-02-08 ENCOUNTER — PREP FOR SURGERY (OUTPATIENT)
Dept: OTHER | Facility: HOSPITAL | Age: 48
End: 2023-02-08
Payer: OTHER GOVERNMENT

## 2023-02-08 ENCOUNTER — OFFICE VISIT (OUTPATIENT)
Dept: BARIATRICS/WEIGHT MGMT | Facility: CLINIC | Age: 48
End: 2023-02-08
Payer: OTHER GOVERNMENT

## 2023-02-08 VITALS
SYSTOLIC BLOOD PRESSURE: 146 MMHG | DIASTOLIC BLOOD PRESSURE: 102 MMHG | BODY MASS INDEX: 34.3 KG/M2 | WEIGHT: 231.6 LBS | HEIGHT: 69 IN | OXYGEN SATURATION: 99 % | HEART RATE: 78 BPM

## 2023-02-08 DIAGNOSIS — R13.10 DYSPHAGIA, UNSPECIFIED TYPE: Primary | ICD-10-CM

## 2023-02-08 DIAGNOSIS — E66.9 OBESITY, CLASS I, BMI 30-34.9: ICD-10-CM

## 2023-02-08 PROCEDURE — 99213 OFFICE O/P EST LOW 20 MIN: CPT | Performed by: NURSE PRACTITIONER

## 2023-02-08 RX ORDER — SODIUM CHLORIDE 9 MG/ML
30 INJECTION, SOLUTION INTRAVENOUS CONTINUOUS PRN
Status: CANCELLED | OUTPATIENT
Start: 2023-02-08

## 2023-02-08 RX ORDER — SODIUM CHLORIDE 0.9 % (FLUSH) 0.9 %
10 SYRINGE (ML) INJECTION AS NEEDED
Status: CANCELLED | OUTPATIENT
Start: 2023-02-08

## 2023-02-08 NOTE — PROGRESS NOTES
"[facility]IATRIC [facility]  [address] [place] [address]  [address] [place] [address]  Dept: [phone]  2/8/2023      Vikash Mccarty.  [id number]  [id number]  1975  male    Date of last surgery: 11/9/2022Gastric Sleeve Laparoscopic With Davinci Robot      Chief Complaint: BH Post-Op Bariatric Surgery:   Vikash Mccarty is status post procedure listed above  HPI:     Wt Readings from Last 10 Encounters:   02/08/23 105 kg (231 lb 9.6 oz)   12/15/22 119 kg (262 lb)   11/14/22 129 kg (285 lb 3.2 oz)   11/09/22 133 kg (292 lb 12.8 oz)   11/04/22 135 kg (298 lb 6.4 oz)   01/27/22 (!) 144 kg (316 lb 12.8 oz)   01/14/20 (!) 155 kg (342 lb 3.2 oz)   10/22/19 (!) 154 kg (338 lb 8 oz)        Today's weight is 105 kg (231 lb 9.6 oz) pounds,@   has a  loss of 31 pounds since the last visit and@ weight loss since surgery is 67 pounds. The patient reports a decreased portion size and loss of appetite.      Vikash Mccarty denies reflux/heartburn, nausea and vomiting     Diet and Exercise: Diet history reviewed and discussed with the patient. Weight loss/gains to date discussed with the patient.     He reports eating 2-3 meals per day, a typical portion size of a few bites, eating 1 snacks per day, drinking 8 or more 8-oz. glasses of water per day, no carbonated beverage consumption and exercising- just got cleared by back doctor to start exercising again.       The patient states they are eating 30-40 grams of protein per day.       able to tolerate: Shrimp, shredded chicken, Kind bars, fruit     Drinks water     Having dysphagia - worse with  Denser foods like hamber and steak and french fries , states feels like food / some liquids are \" getting stuck\" when eating or drinking    Bowel movements consistent,     no protein supplements     Having trouble with medications too       Review of Systems   Constitutional: Positive for activity " "change and appetite change.   Respiratory: Negative.    Cardiovascular: Negative.    Gastrointestinal:        Dysphagia    Musculoskeletal: Negative.          Patient Active Problem List   Diagnosis   • Class 3 severe obesity with body mass index (BMI) of 45.0 to 49.9 in adult ([facility])   • Essential hypertension   • Chronic fatigue   • Insomnia   • Hyperlipidemia   • Sleep apnea   • GERD (gastroesophageal reflux disease)   • Depression with anxiety   • Bipolar 1 disorder ([facility])   • Hypothyroidism   • Class 3 severe obesity with serious comorbidity and body mass index (BMI) of 45.0 to 49.9 in adult ([facility])   • Gastroesophageal reflux disease   • Obstructive sleep apnea syndrome   • Obesity, Class III, BMI 40-49.9 (morbid obesity) ([facility])       Past Medical History:   Diagnosis Date   • Anxiety and depression    • Bipolar 1 disorder ([facility])    • Chest pain     \"[facility] CARDI[facility]\"   [facility]   • Disease of thyroid gland    • GERD (gastroesophageal reflux disease)    • Hyperlipidemia    • Hypertension    • Migraines    • Morbid obesity ([facility])    • Night terrors     cautious to wake up   • Paranoid schizophrenia in remission ([facility])    • Sleep apnea     no machine   • Slow to wake up after anesthesia      Past Surgical History:   Procedure Laterality Date   • COLONOSCOPY  2019   • ENDOSCOPY N/A 01/14/2020    Procedure: ESOPHAGOGASTRODUODENOSCOPY WITH BIOPSY;  Surgeon: Kenrick Zambrano Jr., MD;  Location: [facility] ENDOSCOPY;  Service: General   • SPINAL CORD DECOMPRESSION  02/04/2022   • CORONARY STENT PLACEMENT Right 05/12/2022    Right Coronary artery   • GASTRIC SLEEVE LAPAROSCOPIC N/A 11/09/2022    Procedure: GASTRIC SLEEVE LAPAROSCOPIC WITH DAVINCI ROBOT;  Surgeon: Ryann Santos MD;  Location: [facility];  Service: Robotics - DaVinci;  Laterality: N/A;   • TYMPANOSTOMY TUBE PLACEMENT      leveled ear drum and fixed a hole (2019) reconstructed 99% of ear drum in right ear      The following portions of the patient's " "history were reviewed and updated as appropriate: allergies, current medications, past medical history, past social history, past surgical history and problem list.    Vitals:    02/08/23 1037   BP: (!) 146/102   Pulse: 78   SpO2: 99%   pt encouraged to follow up with cardiologist/ pcp for hypertension     Physical Exam  Constitutional:       Appearance: Normal appearance. He is obese.   Cardiovascular:      Pulses: Normal pulses.   Pulmonary:      Effort: Pulmonary effort is normal.   Abdominal:      General: Abdomen is flat.      Palpations: Abdomen is soft.   Skin:     General: Skin is warm and dry.   Neurological:      General: No focal deficit present.      Mental Status: He is alert and oriented to person, place, and time.   Psychiatric:         Mood and Affect: Mood normal.         Behavior: Behavior normal.         Thought Content: Thought content normal.         Judgment: Judgment normal.             Assessment:   BMI 33.95, class 1 obesity, dysphagia     Post-op, the patient is doing well with weight loss, but he is having worsening dysphagia recently. Pt states the dysphagia has gotten worse over the fast few weeks and is worse with denser/ tougher foods and also some liquids and medications. Pt denies any nausea/ vomiting or GERD and reports \" normal bowel movements.\" plan to schedule pt Monday 2/13 for EGD with possible dilation for likely stricture.     Plan:     Encouraged patient to be sure to get plenty of lean protein per day through small frequent meals all with a protein source.   Activity restrictions: none.   Recommended patient be sure to get at least 70 grams of protein per day by eating small, frequent meals all with high lean protein choices. Be sure to limit/cut back on daily carbohydrate intake. Discussed with the patient the recommended amount of water per day to intake- half of body weight in ounces. Reviewed vitamin requirements. Be sure to do routine exercise, 150 minutes per week " minimum, including both cardio and strength training.     Instructions / Recommendations: dietary counseling recommended, recommended a daily protein intake of  grams, vitamin supplement(s) recommended, recommended exercising at least 150 minutes per week, behavior modifications recommended and instructed to call the office for concerns, questions, or problems.     The patient was instructed to follow up Monday 2/13 for EGD with possible dilation     The patient was counseled regarding dysphagia . Total time spent face to face was 20 minutes and 15 minutes was spent counseling.     KEDAR Martinez  [facility]ariatrics

## 2023-02-08 NOTE — H&P (VIEW-ONLY)
"MGK BAR SURG [facility] GROUP BARIATRIC SURGERY  [address] [place] [address]  [address] [place] [address]  Dept: [phone]  2/8/2023      Vikash Mccarty.  [id number]  [id number]  1975  male    Date of last surgery: 11/9/2022Gastric Sleeve Laparoscopic With Davinci Robot      Chief Complaint: BH Post-Op Bariatric Surgery:   Vikash Mccarty is status post procedure listed above  HPI:     Wt Readings from Last 10 Encounters:   02/08/23 105 kg (231 lb 9.6 oz)   12/15/22 119 kg (262 lb)   11/14/22 129 kg (285 lb 3.2 oz)   11/09/22 133 kg (292 lb 12.8 oz)   11/04/22 135 kg (298 lb 6.4 oz)   01/27/22 (!) 144 kg (316 lb 12.8 oz)   01/14/20 (!) 155 kg (342 lb 3.2 oz)   10/22/19 (!) 154 kg (338 lb 8 oz)        Today's weight is 105 kg (231 lb 9.6 oz) pounds,@   has a  loss of 31 pounds since the last visit and@ weight loss since surgery is 67 pounds. The patient reports a decreased portion size and loss of appetite.      Vikash Mccarty denies reflux/heartburn, nausea and vomiting     Diet and Exercise: Diet history reviewed and discussed with the patient. Weight loss/gains to date discussed with the patient.     He reports eating 2-3 meals per day, a typical portion size of a few bites, eating 1 snacks per day, drinking 8 or more 8-oz. glasses of water per day, no carbonated beverage consumption and exercising- just got cleared by back doctor to start exercising again.       The patient states they are eating 30-40 grams of protein per day.       able to tolerate: Shrimp, shredded chicken, Kind bars, fruit     Drinks water     Having dysphagia - worse with  Denser foods like hamber and steak and french fries , states feels like food / some liquids are \" getting stuck\" when eating or drinking    Bowel movements consistent,     no protein supplements     Having trouble with medications too       Review of Systems   Constitutional: Positive for activity " "change and appetite change.   Respiratory: Negative.    Cardiovascular: Negative.    Gastrointestinal:        Dysphagia    Musculoskeletal: Negative.          Patient Active Problem List   Diagnosis   • Class 3 severe obesity with body mass index (BMI) of 45.0 to 49.9 in adult ([facility])   • Essential hypertension   • Chronic fatigue   • Insomnia   • Hyperlipidemia   • Sleep apnea   • GERD (gastroesophageal reflux disease)   • Depression with anxiety   • Bipolar 1 disorder ([facility])   • Hypothyroidism   • Class 3 severe obesity with serious comorbidity and body mass index (BMI) of 45.0 to 49.9 in adult ([facility])   • Gastroesophageal reflux disease   • Obstructive sleep apnea syndrome   • Obesity, Class III, BMI 40-49.9 (morbid obesity) ([facility])       Past Medical History:   Diagnosis Date   • Anxiety and depression    • Bipolar 1 disorder ([facility])    • Chest pain     [facility]\"   [facility]   • Disease of thyroid gland    • GERD (gastroesophageal reflux disease)    • Hyperlipidemia    • Hypertension    • Migraines    • Morbid obesity ([facility])    • Night terrors     cautious to wake up   • Paranoid schizophrenia in remission ([facility])    • Sleep apnea     no machine   • Slow to wake up after anesthesia      Past Surgical History:   Procedure Laterality Date   • COLONOSCOPY  2019   • ENDOSCOPY N/A 01/14/2020    Procedure: ESOPHAGOGASTRODUODENOSCOPY WITH BIOPSY;  Surgeon: Kenrick Zambrano Jr., MD;  Location: [facility] ENDOSCOPY;  Service: General   • SPINAL CORD DECOMPRESSION  02/04/2022   • CORONARY STENT PLACEMENT Right 05/12/2022    Right Coronary artery   • GASTRIC SLEEVE LAPAROSCOPIC N/A 11/09/2022    Procedure: GASTRIC SLEEVE LAPAROSCOPIC WITH DAVINCI ROBOT;  Surgeon: Ryann Santos MD;  Location: [facility];  Service: Robotics - DaVinci;  Laterality: N/A;   • TYMPANOSTOMY TUBE PLACEMENT      leveled ear drum and fixed a hole (2019) reconstructed 99% of ear drum in right ear      The following portions of the patient's " "history were reviewed and updated as appropriate: allergies, current medications, past medical history, past social history, past surgical history and problem list.    Vitals:    02/08/23 1037   BP: (!) 146/102   Pulse: 78   SpO2: 99%   pt encouraged to follow up with cardiologist/ pcp for hypertension     Physical Exam  Constitutional:       Appearance: Normal appearance. He is obese.   Cardiovascular:      Pulses: Normal pulses.   Pulmonary:      Effort: Pulmonary effort is normal.   Abdominal:      General: Abdomen is flat.      Palpations: Abdomen is soft.   Skin:     General: Skin is warm and dry.   Neurological:      General: No focal deficit present.      Mental Status: He is alert and oriented to person, place, and time.   Psychiatric:         Mood and Affect: Mood normal.         Behavior: Behavior normal.         Thought Content: Thought content normal.         Judgment: Judgment normal.             Assessment:   BMI 33.95, class 1 obesity, dysphagia     Post-op, the patient is doing well with weight loss, but he is having worsening dysphagia recently. Pt states the dysphagia has gotten worse over the fast few weeks and is worse with denser/ tougher foods and also some liquids and medications. Pt denies any nausea/ vomiting or GERD and reports \" normal bowel movements.\" plan to schedule pt Monday 2/13 for EGD with possible dilation for likely stricture.     Plan:     Encouraged patient to be sure to get plenty of lean protein per day through small frequent meals all with a protein source.   Activity restrictions: none.   Recommended patient be sure to get at least 70 grams of protein per day by eating small, frequent meals all with high lean protein choices. Be sure to limit/cut back on daily carbohydrate intake. Discussed with the patient the recommended amount of water per day to intake- half of body weight in ounces. Reviewed vitamin requirements. Be sure to do routine exercise, 150 minutes per week " minimum, including both cardio and strength training.     Instructions / Recommendations: dietary counseling recommended, recommended a daily protein intake of  grams, vitamin supplement(s) recommended, recommended exercising at least 150 minutes per week, behavior modifications recommended and instructed to call the office for concerns, questions, or problems.     The patient was instructed to follow up Monday 2/13 for EGD with possible dilation     The patient was counseled regarding dysphagia . Total time spent face to face was 20 minutes and 15 minutes was spent counseling.     KEDAR Martinez  [facility] Bariatrics

## 2023-02-13 ENCOUNTER — ANESTHESIA EVENT (OUTPATIENT)
Dept: GASTROENTEROLOGY | Facility: HOSPITAL | Age: 48
End: 2023-02-13
Payer: OTHER GOVERNMENT

## 2023-02-13 ENCOUNTER — ANESTHESIA (OUTPATIENT)
Dept: GASTROENTEROLOGY | Facility: HOSPITAL | Age: 48
End: 2023-02-13
Payer: OTHER GOVERNMENT

## 2023-02-13 ENCOUNTER — HOSPITAL ENCOUNTER (OUTPATIENT)
Facility: HOSPITAL | Age: 48
Setting detail: HOSPITAL OUTPATIENT SURGERY
Discharge: HOME | End: 2023-02-13
Attending: SURGERY | Admitting: SURGERY
Payer: OTHER GOVERNMENT

## 2023-02-13 VITALS
DIASTOLIC BLOOD PRESSURE: 86 MMHG | RESPIRATION RATE: 13 BRPM | HEART RATE: 75 BPM | BODY MASS INDEX: 32.22 KG/M2 | OXYGEN SATURATION: 98 % | SYSTOLIC BLOOD PRESSURE: 123 MMHG | HEIGHT: 71 IN | TEMPERATURE: 98.3 F | WEIGHT: 230.16 LBS

## 2023-02-13 DIAGNOSIS — R13.10 DYSPHAGIA, UNSPECIFIED TYPE: ICD-10-CM

## 2023-02-13 PROCEDURE — 88305 TISSUE EXAM BY PATHOLOGIST: CPT | Performed by: SURGERY

## 2023-02-13 PROCEDURE — 43245 EGD DILATE STRICTURE: CPT | Performed by: SURGERY

## 2023-02-13 PROCEDURE — 43239 EGD BIOPSY SINGLE/MULTIPLE: CPT | Performed by: SURGERY

## 2023-02-13 PROCEDURE — C1726 CATH, BAL DIL, NON-VASCULAR: HCPCS | Performed by: SURGERY

## 2023-02-13 RX ORDER — ONDANSETRON 2 MG/ML
4 INJECTION INTRAMUSCULAR; INTRAVENOUS EVERY 6 HOURS PRN
Status: CANCELLED | OUTPATIENT
Start: 2023-02-13

## 2023-02-13 RX ORDER — SODIUM CHLORIDE 9 MG/ML
30 INJECTION, SOLUTION INTRAVENOUS CONTINUOUS PRN
Status: DISCONTINUED | OUTPATIENT
Start: 2023-02-13 | End: 2023-02-13 | Stop reason: HOSPADM

## 2023-02-13 RX ORDER — ONDANSETRON 4 MG/1
4 TABLET, FILM COATED ORAL EVERY 6 HOURS PRN
Status: CANCELLED | OUTPATIENT
Start: 2023-02-13

## 2023-02-13 RX ORDER — LIDOCAINE HYDROCHLORIDE 20 MG/ML
INJECTION, SOLUTION EPIDURAL; INFILTRATION; INTRACAUDAL; PERINEURAL AS NEEDED
Status: DISCONTINUED | OUTPATIENT
Start: 2023-02-13 | End: 2023-02-13 | Stop reason: SURG

## 2023-02-13 RX ORDER — SODIUM CHLORIDE 0.9 % (FLUSH) 0.9 %
10 SYRINGE (ML) INJECTION AS NEEDED
Status: DISCONTINUED | OUTPATIENT
Start: 2023-02-13 | End: 2023-02-13 | Stop reason: HOSPADM

## 2023-02-13 RX ORDER — SUCRALFATE ORAL 1 G/10ML
1 SUSPENSION ORAL 4 TIMES DAILY
Qty: 280 ML | Refills: 5 | Status: SHIPPED | OUTPATIENT
Start: 2023-02-13 | End: 2023-02-20

## 2023-02-13 RX ORDER — GLYCOPYRROLATE 0.2 MG/ML
INJECTION INTRAMUSCULAR; INTRAVENOUS AS NEEDED
Status: DISCONTINUED | OUTPATIENT
Start: 2023-02-13 | End: 2023-02-13 | Stop reason: SURG

## 2023-02-13 RX ADMIN — GLYCOPYRROLATE 0.2 MG: 0.2 INJECTION INTRAMUSCULAR; INTRAVENOUS at 13:32

## 2023-02-13 RX ADMIN — LIDOCAINE HYDROCHLORIDE 50 MG: 20 INJECTION, SOLUTION EPIDURAL; INFILTRATION; INTRACAUDAL; PERINEURAL at 13:36

## 2023-02-13 RX ADMIN — LIDOCAINE HYDROCHLORIDE 100 MG: 20 INJECTION, SOLUTION EPIDURAL; INFILTRATION; INTRACAUDAL; PERINEURAL at 13:32

## 2023-02-13 RX ADMIN — LIDOCAINE HYDROCHLORIDE 50 MG: 20 INJECTION, SOLUTION EPIDURAL; INFILTRATION; INTRACAUDAL; PERINEURAL at 13:39

## 2023-02-13 RX ADMIN — LIDOCAINE HYDROCHLORIDE 50 MG: 20 INJECTION, SOLUTION EPIDURAL; INFILTRATION; INTRACAUDAL; PERINEURAL at 13:40

## 2023-02-13 RX ADMIN — LIDOCAINE HYDROCHLORIDE 50 MG: 20 INJECTION, SOLUTION EPIDURAL; INFILTRATION; INTRACAUDAL; PERINEURAL at 13:34

## 2023-02-13 RX ADMIN — LIDOCAINE HYDROCHLORIDE 50 MG: 20 INJECTION, SOLUTION EPIDURAL; INFILTRATION; INTRACAUDAL; PERINEURAL at 13:38

## 2023-02-13 RX ADMIN — LIDOCAINE HYDROCHLORIDE 50 MG: 20 INJECTION, SOLUTION EPIDURAL; INFILTRATION; INTRACAUDAL; PERINEURAL at 13:37

## 2023-02-13 RX ADMIN — SODIUM CHLORIDE 30 ML/HR: 9 INJECTION, SOLUTION INTRAVENOUS at 13:04

## 2023-02-13 NOTE — ANESTHESIA POSTPROCEDURE EVALUATION
Patient: Vikash Mccarty    Procedure Summary     Date: 02/13/23 Room / Location: [facility] ENDOSCOPY 1 / [facility] ENDOSCOPY    Anesthesia Start: 1330 Anesthesia Stop: 1347    Procedure: ESOPHAGOGASTRODUODENOSCOPY with biopsy gastric antrum, balloon dilation pylorus and gastric midbody (18-20mm non-wire-guided balloon) Diagnosis:       Dysphagia, unspecified type      (Dysphagia, unspecified type [R13.10])    Surgeons: Ryann Santos MD Provider: Fanny Crocker DO    Anesthesia Type: general ASA Status: 3          Anesthesia Type: general    Vitals  Vitals Value Taken Time   /86 02/13/23 1417   Temp     Pulse 75 02/13/23 1417   Resp 13 02/13/23 1410   SpO2 100 % 02/13/23 1417   Vitals shown include unvalidated device data.        Post Anesthesia Care and Evaluation    Patient location during evaluation: PHASE II  Patient participation: complete - patient participated  Level of consciousness: awake  Pain management: adequate    Airway patency: patent  Anesthetic complications: No anesthetic complications  PONV Status: none  Cardiovascular status: acceptable  Respiratory status: acceptable  Hydration status: acceptable  No anesthesia care post op

## 2023-02-13 NOTE — OP NOTE
Surgeon:  Ryann Santos MD  Preoperative Diagnosis: Screening for bariatric surgery    Postoperative Diagnosis: small hiatal hernia, antral gastritis    Procedure Performed: Esophagogastroduodenoscopy with biopsy of the antrum to check for H. Pylori and empiric dilation of pyloris and mid body of stomach    Indications: The patient had gastric sleeve 3 months ago and has been having a lot of difficulty tolerating solid food and also occasionally has gagging and nausea.      Specimen: biopsy of gastric antrum for H. Pylori    EBL: none    Procedure:     The procedure, indications, preparation and potential complications were explained to the patient, who indicated understanding and signed the corresponding consent forms.  The patient was identified, taken to the endoscopy suite, and placed on the left side down decubitus position.  The patient underwent a MAC anesthesia and was appropriately monitored through the case by the anesthesia personnel with continuous pulse oximetry, blood pressure, and cardiac monitoring.  A bite block was placed.  After adequate IV sedation and using a transoral technique a lubed flexible endoscope was placed in the hypopharynx and advanced to the second portion of the duodenum without difficulty. The scope was then withdrawn back into the antrum of the stomach.  Cold forcep biopsies of the antrum were taken to rule out Helicobacter pylori.  The scope was retroflexed noting the body, fundus and cardia.  The scope was then withdrawn back into the esophagus after decompressing the stomach.  The Z line was noted and GE junction measured from the incisors.  The scope was then completely withdrawn.  The patient tolerated the procedure well and left the endoscopy suite in stable condition.  The findings are listed below.      Small hiatal hernia, antral gastritis.  An empiric dilation of the pylorus was performed to try to aid in gastric emptying.  The mid body of the stomach was only slightly  narrowed and I empirically dilated it as well.

## 2023-02-13 NOTE — DISCHARGE INSTRUCTIONS
You appear to have developed a hiatal hernia.  Continue antiacid medication.  He also has some inflammation of the stomach so avoid NSAIDs and smoking.  I will prescribe an additional antiacid medication called Carafate to help this heal.  I also dilated your stomach a little bit today in an effort to help you empty your stomach better.    If the symptoms do not improve there is an operation that may help.  We can discuss this further in the office.        A responsible adult should stay with you and you should rest quietly for the rest of the day.    Do not drink alcohol, drive, operate any heavy machinery or power tools or make any legal/important decisions for the next 24 hours.     Progress your diet as tolerated.  If you begin to experience severe pain, increased shortness of breath, racing heartbeat or a fever above 101 F, seek immediate medical attention.     Follow up with MD as instructed. Call office for results in 3 to 5 days if needed.    For further questions please call Dr. Santos's office at [phone]

## 2023-02-13 NOTE — ANESTHESIA PREPROCEDURE EVALUATION
Anesthesia Evaluation     Patient summary reviewed and Nursing notes reviewed   no history of anesthetic complications:  NPO Solid Status: > 8 hours  NPO Liquid Status: > 2 hours           Airway   Mallampati: II  Dental      Pulmonary    (+) sleep apnea,   Cardiovascular     (+) hypertension, CAD, cardiac stents Drug eluting stent within the past 12 months hyperlipidemia,       Neuro/Psych  (+) headaches, psychiatric history Anxiety, Depression, Bipolar and Schizophrenia,    (-) seizures, CVA  GI/Hepatic/Renal/Endo    (+) obesity, morbid obesity, GERD,  thyroid problem hypothyroidism    Musculoskeletal     Abdominal    Substance History      OB/GYN          Other                        Anesthesia Plan    ASA 3     general     (For EGD, poss dilation  Episodes of dysphagia  Some heartburn controlled on meds  Gastric sleeve 11/22  Cardiac stent, drug eluting 5/22  Off plavix for gastric sleeve and was not asked to restart  No CP/SOB/palp    Mod risk pt for low risk procedure)  intravenous induction     Anesthetic plan, risks, benefits, and alternatives have been provided, discussed and informed consent has been obtained with: patient.        CODE STATUS:

## 2023-02-14 LAB
LAB AP CASE REPORT: NORMAL
LAB AP CLINICAL INFORMATION: NORMAL
PATH REPORT.FINAL DX SPEC: NORMAL
PATH REPORT.GROSS SPEC: NORMAL

## 2023-12-04 ENCOUNTER — OFFICE VISIT (OUTPATIENT)
Dept: BARIATRICS/WEIGHT MGMT | Facility: CLINIC | Age: 48
End: 2023-12-04
Payer: OTHER GOVERNMENT

## 2023-12-04 VITALS
WEIGHT: 219.2 LBS | HEIGHT: 69 IN | DIASTOLIC BLOOD PRESSURE: 89 MMHG | BODY MASS INDEX: 32.47 KG/M2 | HEART RATE: 97 BPM | SYSTOLIC BLOOD PRESSURE: 131 MMHG | OXYGEN SATURATION: 97 %

## 2023-12-04 DIAGNOSIS — Z90.3 H/O GASTRIC SLEEVE: ICD-10-CM

## 2023-12-04 DIAGNOSIS — E66.9 OBESITY, CLASS I, BMI 30-34.9: Primary | ICD-10-CM

## 2023-12-04 PROCEDURE — 99213 OFFICE O/P EST LOW 20 MIN: CPT | Performed by: NURSE PRACTITIONER

## 2023-12-04 RX ORDER — AMOXICILLIN 250 MG
1 CAPSULE ORAL DAILY
Qty: 60 TABLET | Refills: 2 | Status: SHIPPED | OUTPATIENT
Start: 2023-12-04

## 2023-12-04 NOTE — PROGRESS NOTES
[facility] BAR SURG [facility] GROUP BARIATRIC SURGERY  [address] [place] [address]  [address] [place] [address]  Dept: [phone]  12/4/2023      Vikash Mccarty.  [id number]  [id number]  1975  male    Date of last surgery: 2/13/2023ESOPHAGOGASTRODUODENOSCOPY with biopsy gastric antrum, balloon dilation pylorus and gastric midbody (18-20mm non-wire-guided balloon)      Chief Complaint: BH Post-Op Bariatric Surgery:   Vikash JIMENEZ Myas is status post procedure listed above  HPI:     Wt Readings from Last 10 Encounters:   12/04/23 99.4 kg (219 lb 3.2 oz)   02/13/23 104 kg (230 lb 2.6 oz)   02/08/23 105 kg (231 lb 9.6 oz)   12/15/22 119 kg (262 lb)   11/14/22 129 kg (285 lb 3.2 oz)   11/09/22 133 kg (292 lb 12.8 oz)   11/04/22 135 kg (298 lb 6.4 oz)   01/27/22 (!) 144 kg (316 lb 12.8 oz)   01/14/20 (!) 155 kg (342 lb 3.2 oz)   10/22/19 (!) 154 kg (338 lb 8 oz)        Today's weight is 99.4 kg (219 lb 3.2 oz) pounds,BMI@,HE@ has a  loss of 12 pounds since the last visit andHIS@ weight loss since surgery is 80 pounds. The patient reports a decreased portion size and loss of appetite.      Vikash TONY Roques denies reflux/heartburn, nausea, and vomiting     Diet and Exercise: Diet history reviewed and discussed with the patient. Weight loss/gains to date discussed with the patient.     He reports eating 3 meals per day, a typical portion size of 1/2-1 cup, eating 1 snacks per day, drinking 8 or more 8-oz. glasses of water per day, no carbonated beverage consumption and exercising regularly.       The patient states they are eating 40 grams of protein per day.     EGD with dilation on 2/8/23 due to dysphagia - improved   [facility]-2 months     Breakfast: cereal- honey bueno or oatmeal   Lunch: p3 snack pack   Dinner: varies- chicken , veggies prn, green beans or corn or  broccoli  Snacks: veggies or fruit   Drinks: water, sweet tea  "libby  Exercise: hip and back pain, physical therapy     No protein supplements     [facility]      Review of Systems   Constitutional:  Positive for activity change and appetite change.   Respiratory: Negative.     Cardiovascular: Negative.    Gastrointestinal: Negative.    Musculoskeletal:  Positive for arthralgias, back pain and myalgias.       Patient Active Problem List   Diagnosis   • Class 3 severe obesity with body mass index (BMI) of 45.0 to 49.9 in adult   • Essential hypertension   • Chronic fatigue   • Insomnia   • Hyperlipidemia   • Sleep apnea   • GERD (gastroesophageal reflux disease)   • Depression with anxiety   • Bipolar 1 disorder   • Hypothyroidism   • Class 3 severe obesity with serious comorbidity and body mass index (BMI) of 45.0 to 49.9 in adult   • Gastroesophageal reflux disease   • Obstructive sleep apnea syndrome   • Obesity, Class III, BMI 40-49.9 (morbid obesity)   • Dysphagia       Past Medical History:   Diagnosis Date   • Anxiety and depression    • Bipolar 1 disorder    • Chest pain     \"NON CARDIAC\"   [facility]   • Disease of thyroid gland    • GERD (gastroesophageal reflux disease)    • Hyperlipidemia    • Hypertension    • Migraines    • Morbid obesity    • Night terrors     cautious to wake up   • Paranoid schizophrenia in remission    • Sleep apnea     no machine   • Slow to wake up after anesthesia      Past Surgical History:   Procedure Laterality Date   • COLONOSCOPY  2019   • ENDOSCOPY N/A 01/14/2020    Procedure: ESOPHAGOGASTRODUODENOSCOPY WITH BIOPSY;  Surgeon: Kenrick Zambrano Jr., MD;  Location: [facility] ENDOSCOPY;  Service: General   • SPINAL CORD DECOMPRESSION  02/04/2022   • CORONARY STENT PLACEMENT Right 05/12/2022    Right Coronary artery   • GASTRIC SLEEVE LAPAROSCOPIC N/A 11/09/2022    Procedure: GASTRIC SLEEVE LAPAROSCOPIC WITH DAVINCI ROBOT;  Surgeon: Ryann Santos MD;  Location: [facility];  Service: Robotics - " Rose Mariei;  Laterality: N/A;   • ENDOSCOPY N/A 02/13/2023    Procedure: ESOPHAGOGASTRODUODENOSCOPY with biopsy gastric antrum, balloon dilation pylorus and gastric midbody (18-20mm non-wire-guided balloon);  Surgeon: Ryann Santos MD;  Location: [facility] ENDOSCOPY;  Service: General;  Laterality: N/A;  post: gastritis, small hiatal hernia   • PACEMAKER IMPLANTATION  11/2023    [facility]   • TYMPANOSTOMY TUBE PLACEMENT      leveled ear drum and fixed a hole (2019) reconstructed 99% of ear drum in right ear      The following portions of the patient's history were reviewed and updated as appropriate: allergies, current medications, past medical history, past social history, past surgical history, and problem list.    Vitals:    12/04/23 1047   BP: 131/89   Pulse: 97   SpO2: 97%       Physical Exam  Constitutional:       Appearance: Normal appearance.   Pulmonary:      Effort: Pulmonary effort is normal.   Abdominal:      General: Abdomen is flat.      Palpations: Abdomen is soft.   Skin:     General: Skin is warm and dry.   Neurological:      General: No focal deficit present.      Mental Status: He is alert and oriented to person, place, and time.   Psychiatric:         Mood and Affect: Mood normal.         Behavior: Behavior normal.         Thought Content: Thought content normal.         Judgment: Judgment normal.         Assessment:   BMI: 32.14, class 2 obesity, 1 yr gastric sleeve     Post-op, the patient is doing well. Pt had an EGD with dilation in Feb and states his dysphagia has improved since then. [facility] in the next 1-2 months to reassess GERD and iron deficiency. Pt is not getting enough protein in his diet. Encourage 60 + grams of protein in his diet a day. I did speak with the pt about trying clear protein supplements and gave him some samples of this as well. [facility]. Will obtain results for review. Plan to  follow up in 1 yr.     Pt is limited currently with exercise due to new onset arthritis and also ongoing cardiac/ blood pressure problems. He is however going to start working with physical therapy soon.    Plan:     Encouraged patient to be sure to get plenty of lean protein per day through small frequent meals all with a protein source.   Activity restrictions: none.   Recommended patient be sure to get at least 70 grams of protein per day by eating small, frequent meals all with high lean protein choices. Be sure to limit/cut back on daily carbohydrate intake. Discussed with the patient the recommended amount of water per day to intake- half of body weight in ounces. Reviewed vitamin requirements. Be sure to do routine exercise, 150 minutes per week minimum, including both cardio and strength training.     Instructions / Recommendations: dietary counseling recommended, recommended a daily protein intake of  grams, vitamin supplement(s) recommended, recommended exercising at least 150 minutes per week, behavior modifications recommended and instructed to call the office for concerns, questions, or problems.     The patient was instructed to follow up in 12 months.     The patient was counseled regarding diet and exercise. Total time spent face to face was 20 minutes and 15 minutes was spent counseling.     KEDAR Martinez  [facility] Bariatrics

## 2024-01-25 ENCOUNTER — TELEPHONE (OUTPATIENT)
Dept: BARIATRICS/WEIGHT MGMT | Facility: CLINIC | Age: 49
End: 2024-01-25
Payer: OTHER GOVERNMENT

## 2024-12-04 ENCOUNTER — TELEPHONE (OUTPATIENT)
Dept: BARIATRICS/WEIGHT MGMT | Facility: CLINIC | Age: 49
End: 2024-12-04
Payer: OTHER GOVERNMENT

## 2025-04-10 ENCOUNTER — OFFICE VISIT (OUTPATIENT)
Dept: BARIATRICS/WEIGHT MGMT | Facility: CLINIC | Age: 50
End: 2025-04-10
Payer: OTHER GOVERNMENT

## 2025-04-10 ENCOUNTER — TELEPHONE (OUTPATIENT)
Dept: BARIATRICS/WEIGHT MGMT | Facility: CLINIC | Age: 50
End: 2025-04-10
Payer: OTHER GOVERNMENT

## 2025-04-10 ENCOUNTER — PREP FOR SURGERY (OUTPATIENT)
Dept: OTHER | Facility: HOSPITAL | Age: 50
End: 2025-04-10
Payer: OTHER GOVERNMENT

## 2025-04-10 VITALS
WEIGHT: 245.1 LBS | HEART RATE: 86 BPM | BODY MASS INDEX: 35.09 KG/M2 | OXYGEN SATURATION: 98 % | HEIGHT: 70 IN | SYSTOLIC BLOOD PRESSURE: 134 MMHG | RESPIRATION RATE: 18 BRPM | DIASTOLIC BLOOD PRESSURE: 90 MMHG

## 2025-04-10 DIAGNOSIS — K44.9 HIATAL HERNIA WITH GERD: Primary | ICD-10-CM

## 2025-04-10 DIAGNOSIS — Z90.3 H/O GASTRIC SLEEVE: Primary | ICD-10-CM

## 2025-04-10 DIAGNOSIS — K21.9 HIATAL HERNIA WITH GERD: Primary | ICD-10-CM

## 2025-04-10 PROBLEM — E66.01 MORBID OBESITY: Status: ACTIVE | Noted: 2025-04-10

## 2025-04-10 RX ORDER — SODIUM CHLORIDE 0.9 % (FLUSH) 0.9 %
3-10 SYRINGE (ML) INJECTION AS NEEDED
OUTPATIENT
Start: 2025-04-10

## 2025-04-10 RX ORDER — SODIUM CHLORIDE, SODIUM LACTATE, POTASSIUM CHLORIDE, CALCIUM CHLORIDE 600; 310; 30; 20 MG/100ML; MG/100ML; MG/100ML; MG/100ML
100 INJECTION, SOLUTION INTRAVENOUS CONTINUOUS
OUTPATIENT
Start: 2025-04-10 | End: 2025-04-11

## 2025-04-10 RX ORDER — CHLORHEXIDINE GLUCONATE ORAL RINSE 1.2 MG/ML
15 SOLUTION DENTAL SEE ADMIN INSTRUCTIONS
OUTPATIENT
Start: 2025-04-10

## 2025-04-10 RX ORDER — SODIUM CHLORIDE 9 MG/ML
40 INJECTION, SOLUTION INTRAVENOUS AS NEEDED
OUTPATIENT
Start: 2025-04-10

## 2025-04-10 RX ORDER — SODIUM CHLORIDE 0.9 % (FLUSH) 0.9 %
3 SYRINGE (ML) INJECTION EVERY 12 HOURS SCHEDULED
OUTPATIENT
Start: 2025-04-10

## 2025-04-10 NOTE — PROGRESS NOTES
[facility] BAR SURG [facility] BARIATRIC SURGERY  [address] [place] [address]  [address] [place] [address]  Dept: [phone]  4/10/2025      Vikash Mccarty.  [id number]  [id number]  1975  male    Date of last surgery: 2/13/2023ESOPHAGOGASTRODUODENOSCOPY with biopsy gastric antrum, balloon dilation pylorus and gastric midbody (18-20mm non-wire-guided balloon)      Chief Complaint: BH Post-Op Bariatric Surgery:   Vikash Mccarty is status post procedure listed above  HPI:   He had a gastric sleeve in 2022.  He has been complaining of GERD around that time an upper endoscopy in 2023 demonstrated evidence of a small hiatal hernia.  He has recently increased his PPI from once a day to twice a day and says that the symptoms have been getting worse.  He has burning in the chest and also some epigastric discomfort.  Wt Readings from Last 10 Encounters:   04/10/25 111 kg (245 lb 1.6 oz)   12/04/23 99.4 kg (219 lb 3.2 oz)   02/13/23 104 kg (230 lb 2.6 oz)   02/08/23 105 kg (231 lb 9.6 oz)   12/15/22 119 kg (262 lb)   11/14/22 129 kg (285 lb 3.2 oz)   11/09/22 133 kg (292 lb 12.8 oz)   11/04/22 135 kg (298 lb 6.4 oz)   01/27/22 (!) 144 kg (316 lb 12.8 oz)   01/14/20 (!) 155 kg (342 lb 3.2 oz)              Review of Systems    Review of Systems   Constitutional: Negative.    HENT: Negative.    Eyes: Negative.    Respiratory: Negative.    Cardiovascular: Negative.    Gastrointestinal: Negative.    Endocrine: Negative.    Genitourinary: Negative.    Musculoskeletal: Negative.    Skin: Negative.    Allergic/Immunologic: Negative.    Neurological: Negative.    Hematological: Negative.    Psychiatric/Behavioral: Negative.    Patient Active Problem List   Diagnosis    Class 3 severe obesity with body mass index (BMI) of 45.0 to 49.9 in adult    Essential hypertension    Chronic fatigue    Insomnia    Hyperlipidemia    Sleep apnea    GERD (gastroesophageal reflux  "disease)    Depression with anxiety    Bipolar 1 disorder    Hypothyroidism    Class 3 severe obesity with serious comorbidity and body mass index (BMI) of 45.0 to 49.9 in adult    Gastroesophageal reflux disease    Obstructive sleep apnea syndrome    Obesity, Class III, BMI 40-49.9 (morbid obesity)    Dysphagia       Past Medical History:   Diagnosis Date    Anxiety and depression     Bipolar 1 disorder     Chest pain     \"NON CARDIAC\"   [facility]    Disease of thyroid gland     GERD (gastroesophageal reflux disease)     Hyperlipidemia     Hypertension     Migraines     Morbid obesity     Night terrors     cautious to wake up    Paranoid schizophrenia in remission     Sleep apnea     no machine    Slow to wake up after anesthesia      Past Surgical History:   Procedure Laterality Date    COLONOSCOPY  2019    ENDOSCOPY N/A 01/14/2020    Procedure: ESOPHAGOGASTRODUODENOSCOPY WITH BIOPSY;  Surgeon: Kenrick Zambrano Jr., MD;  Location: [facility] ENDOSCOPY;  Service: General    SPINAL CORD DECOMPRESSION  02/04/2022    CORONARY STENT PLACEMENT Right 05/12/2022    Right Coronary artery    GASTRIC SLEEVE LAPAROSCOPIC N/A 11/09/2022    Procedure: GASTRIC SLEEVE LAPAROSCOPIC WITH DAVINCI ROBOT;  Surgeon: Ryann Santos MD;  Location: [facility] MAIN OR;  Service: Robotics - DaVinci;  Laterality: N/A;    ENDOSCOPY N/A 02/13/2023    Procedure: ESOPHAGOGASTRODUODENOSCOPY with biopsy gastric antrum, balloon dilation pylorus and gastric midbody (18-20mm non-wire-guided balloon);  Surgeon: Ryann Santos MD;  Location: [facility] ENDOSCOPY;  Service: General;  Laterality: N/A;  post: gastritis, small hiatal hernia    PACEMAKER IMPLANTATION  11/2023    [facility]    TYMPANOSTOMY TUBE PLACEMENT      leveled ear drum and fixed a hole (2019) reconstructed 99% of ear drum in right ear      The following portions of the patient's history were reviewed and updated as appropriate: allergies, current medications, " past family history, past medical history, past social history, past surgical history, and problem list.    Vitals:    04/10/25 0904   BP: 134/90   Pulse: 86   Resp: 18   SpO2: 98%       Physical Exam  Awake and alert  Normal mental status  Normal pulmonary effort  Abdomen appropriate tenderness  Incisions no erythema  Extremities no tenderness or swelling      Assessment:     Hiatal hernia with GERD    Post-op, the patient has hiatal hernia with GERD.  He is increase his PPI to twice a day and still has symptoms.  The symptoms are much worse when he does not take his PPI..     Plan: Plan for robotic assisted laparoscopic hiatal hernia repair.  I explained the risks and benefits of the procedure and he understands these and agrees to proceed.

## 2025-04-10 NOTE — H&P (VIEW-ONLY)
[facility] SURG [facility] BARIATRIC SURGERY  [address] [place] [address]  [address] [place] [address]  Dept: [phone]  4/10/2025      Vikash Mccarty.  [id number]  [id number]  1975  male    Date of last surgery: 2/13/2023ESOPHAGOGASTRODUODENOSCOPY with biopsy gastric antrum, balloon dilation pylorus and gastric midbody (18-20mm non-wire-guided balloon)      Chief Complaint: BH Post-Op Bariatric Surgery:   Vikash Mccarty is status post procedure listed above  HPI:   He had a gastric sleeve in 2022.  He has been complaining of GERD around that time an upper endoscopy in 2023 demonstrated evidence of a small hiatal hernia.  He has recently increased his PPI from once a day to twice a day and says that the symptoms have been getting worse.  He has burning in the chest and also some epigastric discomfort.  Wt Readings from Last 10 Encounters:   04/10/25 111 kg (245 lb 1.6 oz)   12/04/23 99.4 kg (219 lb 3.2 oz)   02/13/23 104 kg (230 lb 2.6 oz)   02/08/23 105 kg (231 lb 9.6 oz)   12/15/22 119 kg (262 lb)   11/14/22 129 kg (285 lb 3.2 oz)   11/09/22 133 kg (292 lb 12.8 oz)   11/04/22 135 kg (298 lb 6.4 oz)   01/27/22 (!) 144 kg (316 lb 12.8 oz)   01/14/20 (!) 155 kg (342 lb 3.2 oz)              Review of Systems    Review of Systems   Constitutional: Negative.    HENT: Negative.    Eyes: Negative.    Respiratory: Negative.    Cardiovascular: Negative.    Gastrointestinal: Negative.    Endocrine: Negative.    Genitourinary: Negative.    Musculoskeletal: Negative.    Skin: Negative.    Allergic/Immunologic: Negative.    Neurological: Negative.    Hematological: Negative.    Psychiatric/Behavioral: Negative.    Patient Active Problem List   Diagnosis    Class 3 severe obesity with body mass index (BMI) of 45.0 to 49.9 in adult    Essential hypertension    Chronic fatigue    Insomnia    Hyperlipidemia    Sleep apnea    GERD (gastroesophageal reflux  "disease)    Depression with anxiety    Bipolar 1 disorder    Hypothyroidism    Class 3 severe obesity with serious comorbidity and body mass index (BMI) of 45.0 to 49.9 in adult    Gastroesophageal reflux disease    Obstructive sleep apnea syndrome    Obesity, Class III, BMI 40-49.9 (morbid obesity)    Dysphagia       Past Medical History:   Diagnosis Date    Anxiety and depression     Bipolar 1 disorder     Chest pain     \"NON CARDIAC\"   [facility]    Disease of thyroid gland     GERD (gastroesophageal reflux disease)     Hyperlipidemia     Hypertension     Migraines     Morbid obesity     Night terrors     cautious to wake up    Paranoid schizophrenia in remission     Sleep apnea     no machine    Slow to wake up after anesthesia      Past Surgical History:   Procedure Laterality Date    COLONOSCOPY  2019    ENDOSCOPY N/A 01/14/2020    Procedure: ESOPHAGOGASTRODUODENOSCOPY WITH BIOPSY;  Surgeon: Kenrick Zambrano Jr., MD;  Location: [facility] ENDOSCOPY;  Service: General    SPINAL CORD DECOMPRESSION  02/04/2022    CORONARY STENT PLACEMENT Right 05/12/2022    Right Coronary artery    GASTRIC SLEEVE LAPAROSCOPIC N/A 11/09/2022    Procedure: GASTRIC SLEEVE LAPAROSCOPIC WITH DAVINCI ROBOT;  Surgeon: Ryann Santos MD;  Location: [facility] MAIN OR;  Service: Robotics - DaVinci;  Laterality: N/A;    ENDOSCOPY N/A 02/13/2023    Procedure: ESOPHAGOGASTRODUODENOSCOPY with biopsy gastric antrum, balloon dilation pylorus and gastric midbody (18-20mm non-wire-guided balloon);  Surgeon: Ryann Santos MD;  Location: [facility] ENDOSCOPY;  Service: General;  Laterality: N/A;  post: gastritis, small hiatal hernia    PACEMAKER IMPLANTATION  11/2023    [facility]    TYMPANOSTOMY TUBE PLACEMENT      leveled ear drum and fixed a hole (2019) reconstructed 99% of ear drum in right ear      The following portions of the patient's history were reviewed and updated as appropriate: allergies, current medications, " past family history, past medical history, past social history, past surgical history, and problem list.    Vitals:    04/10/25 0904   BP: 134/90   Pulse: 86   Resp: 18   SpO2: 98%       Physical Exam  Awake and alert  Normal mental status  Normal pulmonary effort  Abdomen appropriate tenderness  Incisions no erythema  Extremities no tenderness or swelling      Assessment:     Hiatal hernia with GERD    Post-op, the patient has hiatal hernia with GERD.  He is increase his PPI to twice a day and still has symptoms.  The symptoms are much worse when he does not take his PPI..     Plan: Plan for robotic assisted laparoscopic hiatal hernia repair.  I explained the risks and benefits of the procedure and he understands these and agrees to proceed.

## 2025-04-11 RX ORDER — OXYCODONE AND ACETAMINOPHEN 5; 325 MG/1; MG/1
1 TABLET ORAL EVERY 6 HOURS PRN
COMMUNITY

## 2025-04-11 RX ORDER — SILDENAFIL 100 MG/1
100 TABLET, FILM COATED ORAL DAILY PRN
COMMUNITY

## 2025-04-11 RX ORDER — ROPINIROLE 1 MG/1
1 TABLET, FILM COATED ORAL NIGHTLY
COMMUNITY

## 2025-04-11 RX ORDER — MULTIPLE VITAMINS W/ MINERALS TAB 9MG-400MCG
1 TAB ORAL DAILY
COMMUNITY

## 2025-04-11 RX ORDER — OXCARBAZEPINE 300 MG/1
600 TABLET, FILM COATED ORAL 2 TIMES DAILY
COMMUNITY

## 2025-04-11 RX ORDER — ASPIRIN 81 MG/1
81 TABLET, CHEWABLE ORAL DAILY
COMMUNITY

## 2025-04-11 RX ORDER — MONTELUKAST SODIUM 10 MG/1
10 TABLET ORAL NIGHTLY
COMMUNITY

## 2025-04-11 RX ORDER — PROPRANOLOL HYDROCHLORIDE 10 MG/1
10 TABLET ORAL DAILY
COMMUNITY

## 2025-04-15 ENCOUNTER — LAB (OUTPATIENT)
Dept: LAB | Facility: HOSPITAL | Age: 50
End: 2025-04-15
Payer: OTHER GOVERNMENT

## 2025-04-15 ENCOUNTER — HOSPITAL ENCOUNTER (OUTPATIENT)
Dept: CARDIOLOGY | Facility: HOSPITAL | Age: 50
Discharge: HOME | End: 2025-04-15
Payer: OTHER GOVERNMENT

## 2025-04-15 DIAGNOSIS — K21.9 HIATAL HERNIA WITH GERD: ICD-10-CM

## 2025-04-15 DIAGNOSIS — K44.9 HIATAL HERNIA WITH GERD: ICD-10-CM

## 2025-04-15 LAB
ABO GROUP BLD: NORMAL
ANION GAP SERPL CALCULATED.3IONS-SCNC: 13 MMOL/L (ref 5–15)
BASOPHILS # BLD AUTO: 0.07 10*3/MM3 (ref 0–0.2)
BASOPHILS NFR BLD AUTO: 1.3 % (ref 0–1.5)
BLD GP AB SCN SERPL QL: NEGATIVE
BUN SERPL-MCNC: 13 MG/DL (ref 6–20)
BUN/CREAT SERPL: 11 (ref 7–25)
CALCIUM SPEC-SCNC: 9.6 MG/DL (ref 8.6–10.5)
CHLORIDE SERPL-SCNC: 101 MMOL/L (ref 98–107)
CO2 SERPL-SCNC: 27 MMOL/L (ref 22–29)
CREAT SERPL-MCNC: 1.18 MG/DL (ref 0.76–1.27)
DEPRECATED RDW RBC AUTO: 43 FL (ref 37–54)
EGFRCR SERPLBLD CKD-EPI 2021: 76.1 ML/MIN/1.73
EOSINOPHIL # BLD AUTO: 0.37 10*3/MM3 (ref 0–0.4)
EOSINOPHIL NFR BLD AUTO: 6.7 % (ref 0.3–6.2)
ERYTHROCYTE [DISTWIDTH] IN BLOOD BY AUTOMATED COUNT: 13.1 % (ref 12.3–15.4)
GLUCOSE SERPL-MCNC: 89 MG/DL (ref 65–99)
HBA1C MFR BLD: 4.92 % (ref 4.8–5.6)
HCT VFR BLD AUTO: 44 % (ref 37.5–51)
HGB BLD-MCNC: 14.6 G/DL (ref 13–17.7)
IMM GRANULOCYTES # BLD AUTO: 0.01 10*3/MM3 (ref 0–0.05)
IMM GRANULOCYTES NFR BLD AUTO: 0.2 % (ref 0–0.5)
LYMPHOCYTES # BLD AUTO: 1.86 10*3/MM3 (ref 0.7–3.1)
LYMPHOCYTES NFR BLD AUTO: 33.5 % (ref 19.6–45.3)
MCH RBC QN AUTO: 29.9 PG (ref 26.6–33)
MCHC RBC AUTO-ENTMCNC: 33.2 G/DL (ref 31.5–35.7)
MCV RBC AUTO: 90.2 FL (ref 79–97)
MONOCYTES # BLD AUTO: 0.32 10*3/MM3 (ref 0.1–0.9)
MONOCYTES NFR BLD AUTO: 5.8 % (ref 5–12)
NEUTROPHILS NFR BLD AUTO: 2.93 10*3/MM3 (ref 1.7–7)
NEUTROPHILS NFR BLD AUTO: 52.5 % (ref 42.7–76)
NRBC BLD AUTO-RTO: 0 /100 WBC (ref 0–0.2)
PLATELET # BLD AUTO: 250 10*3/MM3 (ref 140–450)
PMV BLD AUTO: 8.9 FL (ref 6–12)
POTASSIUM SERPL-SCNC: 4.1 MMOL/L (ref 3.5–5.2)
QT INTERVAL: 376 MS
QTC INTERVAL: 418 MS
RBC # BLD AUTO: 4.88 10*6/MM3 (ref 4.14–5.8)
RH BLD: POSITIVE
SODIUM SERPL-SCNC: 141 MMOL/L (ref 136–145)
T&S EXPIRATION DATE: NORMAL
WBC NRBC COR # BLD AUTO: 5.56 10*3/MM3 (ref 3.4–10.8)

## 2025-04-15 PROCEDURE — 86850 RBC ANTIBODY SCREEN: CPT

## 2025-04-15 PROCEDURE — 80048 BASIC METABOLIC PNL TOTAL CA: CPT | Performed by: SURGERY

## 2025-04-15 PROCEDURE — 36415 COLL VENOUS BLD VENIPUNCTURE: CPT | Performed by: SURGERY

## 2025-04-15 PROCEDURE — 83036 HEMOGLOBIN GLYCOSYLATED A1C: CPT | Performed by: SURGERY

## 2025-04-15 PROCEDURE — 86901 BLOOD TYPING SEROLOGIC RH(D): CPT

## 2025-04-15 PROCEDURE — 93010 ELECTROCARDIOGRAM REPORT: CPT | Performed by: INTERNAL MEDICINE

## 2025-04-15 PROCEDURE — 93005 ELECTROCARDIOGRAM TRACING: CPT

## 2025-04-15 PROCEDURE — 86900 BLOOD TYPING SEROLOGIC ABO: CPT

## 2025-04-15 PROCEDURE — 85025 COMPLETE CBC W/AUTO DIFF WBC: CPT | Performed by: SURGERY

## 2025-04-21 ENCOUNTER — ANESTHESIA EVENT (OUTPATIENT)
Dept: PERIOP | Facility: HOSPITAL | Age: 50
End: 2025-04-21
Payer: OTHER GOVERNMENT

## 2025-04-21 NOTE — ANESTHESIA PREPROCEDURE EVALUATION
" Anesthesia Evaluation     history of anesthetic complications:  difficult airway  NPO Solid Status: > 8 hours  NPO Liquid Status: > 8 hours           Airway   Mallampati: I  TM distance: >3 FB  Neck ROM: full  No difficulty expected  Dental - normal exam     Pulmonary - normal exam   (+) ,sleep apnea  Cardiovascular - normal exam    (+) hypertension, CAD, hyperlipidemia      Neuro/Psych  (+) headaches, psychiatric history  GI/Hepatic/Renal/Endo    (+) obesity, morbid obesity, hiatal hernia, GERD, thyroid problem hypothyroidism    Musculoskeletal     Abdominal  - normal exam    Bowel sounds: normal.   Substance History      OB/GYN          Other          Other Comment: Hyperlipidemia  Hypertension   Chest pain \"NON CARDIAC\"   [facility] Disease of thyroid gland   GERD (gastroesophageal reflux disease)  Migraines   Anxiety and depression  Sleep apnea no machine  Paranoid schizophrenia in remission  Bipolar 1 disorder   Slow to wake up after anesthesia  Morbid obesity   Night terrors cautious to wake up Coronary artery disease     Surgical History  Current as of 04/21/25 1939  COLONOSCOPY ENDOSCOPY  CORONARY STENT PLACEMENT TYMPANOSTOMY TUBE PLACEMENT  GASTRIC SLEEVE LAPAROSCOPIC SPINAL CORD DECOMPRESSION  ENDOSCOPY PACEMAKER IMPLANTATION      ROS/Med Hx Other: 11/2022 GASTRIC SLEEVE MAC 4 IIB ESCHMAN  CAD STENTS IN [facility]ST ([facility]) NO PROBLEMS. ASYMPTOMATIC GOOD FXNAL CAPACITY. LOOP RECORDER (NO PACER)              Anesthesia Plan    ASA 3     general     intravenous induction     Anesthetic plan, risks, benefits, and alternatives have been provided, discussed and informed consent has been obtained with: patient.  Pre-procedure education provided  Plan discussed with CRNA.    CODE STATUS:         "

## 2025-04-22 ENCOUNTER — APPOINTMENT (OUTPATIENT)
Dept: GENERAL RADIOLOGY | Facility: HOSPITAL | Age: 50
End: 2025-04-22
Payer: OTHER GOVERNMENT

## 2025-04-22 ENCOUNTER — HOSPITAL ENCOUNTER (OUTPATIENT)
Facility: HOSPITAL | Age: 50
Discharge: HOME | End: 2025-04-22
Attending: SURGERY | Admitting: SURGERY
Payer: OTHER GOVERNMENT

## 2025-04-22 ENCOUNTER — ANESTHESIA (OUTPATIENT)
Dept: PERIOP | Facility: HOSPITAL | Age: 50
End: 2025-04-22
Payer: OTHER GOVERNMENT

## 2025-04-22 VITALS
TEMPERATURE: 98.1 F | SYSTOLIC BLOOD PRESSURE: 142 MMHG | OXYGEN SATURATION: 93 % | WEIGHT: 243.6 LBS | RESPIRATION RATE: 16 BRPM | DIASTOLIC BLOOD PRESSURE: 90 MMHG | HEART RATE: 93 BPM | BODY MASS INDEX: 34.88 KG/M2 | HEIGHT: 70 IN

## 2025-04-22 DIAGNOSIS — K44.9 HIATAL HERNIA WITH GERD: ICD-10-CM

## 2025-04-22 DIAGNOSIS — K21.9 HIATAL HERNIA WITH GERD: ICD-10-CM

## 2025-04-22 PROCEDURE — 25010000002 HYDROMORPHONE 1 MG/ML SOLUTION: Performed by: NURSE ANESTHETIST, CERTIFIED REGISTERED

## 2025-04-22 PROCEDURE — 25010000002 ONDANSETRON PER 1 MG: Performed by: NURSE ANESTHETIST, CERTIFIED REGISTERED

## 2025-04-22 PROCEDURE — 25010000002 DEXAMETHASONE PER 1 MG: Performed by: NURSE ANESTHETIST, CERTIFIED REGISTERED

## 2025-04-22 PROCEDURE — 25010000002 PROPOFOL 10 MG/ML EMULSION: Performed by: NURSE ANESTHETIST, CERTIFIED REGISTERED

## 2025-04-22 PROCEDURE — 25010000002 ROPIVACAINE PER 1 MG: Performed by: SURGERY

## 2025-04-22 PROCEDURE — 25010000002 FENTANYL CITRATE (PF) 100 MCG/2ML SOLUTION: Performed by: NURSE ANESTHETIST, CERTIFIED REGISTERED

## 2025-04-22 PROCEDURE — 25810000003 LACTATED RINGERS PER 1000 ML: Performed by: NURSE ANESTHETIST, CERTIFIED REGISTERED

## 2025-04-22 PROCEDURE — 25010000002 CLONIDINE PER 1 MG: Performed by: SURGERY

## 2025-04-22 PROCEDURE — 25010000002 THIAMINE PER 100 MG: Performed by: SURGERY

## 2025-04-22 PROCEDURE — 25010000002 CEFAZOLIN PER 500 MG: Performed by: SURGERY

## 2025-04-22 PROCEDURE — 25010000002 SUGAMMADEX 200 MG/2ML SOLUTION: Performed by: NURSE ANESTHETIST, CERTIFIED REGISTERED

## 2025-04-22 PROCEDURE — 25010000002 GLYCOPYRROLATE 0.2 MG/ML SOLUTION: Performed by: NURSE ANESTHETIST, CERTIFIED REGISTERED

## 2025-04-22 PROCEDURE — 25810000003 LACTATED RINGERS SOLUTION: Performed by: SURGERY

## 2025-04-22 PROCEDURE — 43280 LAPAROSCOPY FUNDOPLASTY: CPT | Performed by: NURSE PRACTITIONER

## 2025-04-22 PROCEDURE — 25810000003 LACTATED RINGERS PER 1000 ML: Performed by: SURGERY

## 2025-04-22 PROCEDURE — 43280 LAPAROSCOPY FUNDOPLASTY: CPT | Performed by: SURGERY

## 2025-04-22 PROCEDURE — 71045 X-RAY EXAM CHEST 1 VIEW: CPT

## 2025-04-22 PROCEDURE — 25010000002 EPINEPHRINE 1 MG/ML SOLUTION: Performed by: SURGERY

## 2025-04-22 PROCEDURE — G0378 HOSPITAL OBSERVATION PER HR: HCPCS

## 2025-04-22 PROCEDURE — 25010000002 FENTANYL CITRATE (PF) 50 MCG/ML SOLUTION: Performed by: NURSE ANESTHETIST, CERTIFIED REGISTERED

## 2025-04-22 PROCEDURE — 25010000002 KETOROLAC TROMETHAMINE PER 15 MG: Performed by: SURGERY

## 2025-04-22 DEVICE — ABSORBABLE WOUND CLOSURE DEVICE
Type: IMPLANTABLE DEVICE | Site: ABDOMEN | Status: FUNCTIONAL
Brand: V-LOC 180

## 2025-04-22 DEVICE — ABSORBABLE WOUND CLOSURE DEVICE
Type: IMPLANTABLE DEVICE | Site: ABDOMEN | Status: FUNCTIONAL
Brand: SYNETURE

## 2025-04-22 DEVICE — PBT NON ABSORBABLE WOUND CLOSURE DEVICE
Type: IMPLANTABLE DEVICE | Site: ABDOMEN | Status: FUNCTIONAL
Brand: V-LOC

## 2025-04-22 RX ORDER — PROMETHAZINE HYDROCHLORIDE 12.5 MG/1
12.5 SUPPOSITORY RECTAL EVERY 6 HOURS PRN
Status: DISCONTINUED | OUTPATIENT
Start: 2025-04-22 | End: 2025-04-22 | Stop reason: HOSPADM

## 2025-04-22 RX ORDER — METOCLOPRAMIDE HYDROCHLORIDE 5 MG/ML
10 INJECTION INTRAMUSCULAR; INTRAVENOUS EVERY 6 HOURS PRN
Status: DISCONTINUED | OUTPATIENT
Start: 2025-04-22 | End: 2025-04-22 | Stop reason: HOSPADM

## 2025-04-22 RX ORDER — SCOPOLAMINE 1 MG/3D
1 PATCH, EXTENDED RELEASE TRANSDERMAL ONCE
Status: DISCONTINUED | OUTPATIENT
Start: 2025-04-22 | End: 2025-04-22 | Stop reason: HOSPADM

## 2025-04-22 RX ORDER — IPRATROPIUM BROMIDE AND ALBUTEROL SULFATE 2.5; .5 MG/3ML; MG/3ML
3 SOLUTION RESPIRATORY (INHALATION) ONCE AS NEEDED
Status: DISCONTINUED | OUTPATIENT
Start: 2025-04-22 | End: 2025-04-22 | Stop reason: HOSPADM

## 2025-04-22 RX ORDER — FENTANYL CITRATE 50 UG/ML
INJECTION, SOLUTION INTRAMUSCULAR; INTRAVENOUS AS NEEDED
Status: DISCONTINUED | OUTPATIENT
Start: 2025-04-22 | End: 2025-04-22 | Stop reason: SURG

## 2025-04-22 RX ORDER — CHLORHEXIDINE GLUCONATE ORAL RINSE 1.2 MG/ML
15 SOLUTION DENTAL SEE ADMIN INSTRUCTIONS
Status: COMPLETED | OUTPATIENT
Start: 2025-04-22 | End: 2025-04-22

## 2025-04-22 RX ORDER — SODIUM CHLORIDE 0.9 % (FLUSH) 0.9 %
3-10 SYRINGE (ML) INJECTION AS NEEDED
Status: DISCONTINUED | OUTPATIENT
Start: 2025-04-22 | End: 2025-04-22 | Stop reason: HOSPADM

## 2025-04-22 RX ORDER — DIPHENHYDRAMINE HYDROCHLORIDE 50 MG/ML
12.5 INJECTION, SOLUTION INTRAMUSCULAR; INTRAVENOUS
Status: DISCONTINUED | OUTPATIENT
Start: 2025-04-22 | End: 2025-04-22 | Stop reason: HOSPADM

## 2025-04-22 RX ORDER — CYANOCOBALAMIN 1000 UG/ML
1000 INJECTION, SOLUTION INTRAMUSCULAR; SUBCUTANEOUS ONCE
Status: DISCONTINUED | OUTPATIENT
Start: 2025-04-23 | End: 2025-04-22 | Stop reason: HOSPADM

## 2025-04-22 RX ORDER — GLYCOPYRROLATE 0.2 MG/ML
INJECTION INTRAMUSCULAR; INTRAVENOUS AS NEEDED
Status: DISCONTINUED | OUTPATIENT
Start: 2025-04-22 | End: 2025-04-22 | Stop reason: SURG

## 2025-04-22 RX ORDER — LEVOTHYROXINE SODIUM 50 UG/1
50 TABLET ORAL DAILY
Status: DISCONTINUED | OUTPATIENT
Start: 2025-04-22 | End: 2025-04-22 | Stop reason: HOSPADM

## 2025-04-22 RX ORDER — ROPINIROLE 1 MG/1
1 TABLET, FILM COATED ORAL NIGHTLY
Status: DISCONTINUED | OUTPATIENT
Start: 2025-04-22 | End: 2025-04-22 | Stop reason: HOSPADM

## 2025-04-22 RX ORDER — OXYCODONE HYDROCHLORIDE 5 MG/1
5 TABLET ORAL EVERY 4 HOURS PRN
Status: DISCONTINUED | OUTPATIENT
Start: 2025-04-22 | End: 2025-04-22 | Stop reason: HOSPADM

## 2025-04-22 RX ORDER — ACETAMINOPHEN 160 MG/5ML
1000 SOLUTION ORAL EVERY 6 HOURS
Status: DISCONTINUED | OUTPATIENT
Start: 2025-04-22 | End: 2025-04-22 | Stop reason: HOSPADM

## 2025-04-22 RX ORDER — ONDANSETRON 2 MG/ML
8 INJECTION INTRAMUSCULAR; INTRAVENOUS EVERY 6 HOURS PRN
Status: DISCONTINUED | OUTPATIENT
Start: 2025-04-22 | End: 2025-04-22 | Stop reason: HOSPADM

## 2025-04-22 RX ORDER — NALOXONE HCL 0.4 MG/ML
0.4 VIAL (ML) INJECTION AS NEEDED
Status: DISCONTINUED | OUTPATIENT
Start: 2025-04-22 | End: 2025-04-22 | Stop reason: HOSPADM

## 2025-04-22 RX ORDER — PANTOPRAZOLE SODIUM 40 MG/1
40 TABLET, DELAYED RELEASE ORAL 2 TIMES DAILY
Status: DISCONTINUED | OUTPATIENT
Start: 2025-04-22 | End: 2025-04-22 | Stop reason: HOSPADM

## 2025-04-22 RX ORDER — SODIUM CHLORIDE, SODIUM LACTATE, POTASSIUM CHLORIDE, CALCIUM CHLORIDE 600; 310; 30; 20 MG/100ML; MG/100ML; MG/100ML; MG/100ML
100 INJECTION, SOLUTION INTRAVENOUS CONTINUOUS
Status: DISCONTINUED | OUTPATIENT
Start: 2025-04-22 | End: 2025-04-22 | Stop reason: HOSPADM

## 2025-04-22 RX ORDER — MONTELUKAST SODIUM 10 MG/1
10 TABLET ORAL NIGHTLY
Status: DISCONTINUED | OUTPATIENT
Start: 2025-04-22 | End: 2025-04-22 | Stop reason: HOSPADM

## 2025-04-22 RX ORDER — HYDRALAZINE HYDROCHLORIDE 20 MG/ML
5 INJECTION INTRAMUSCULAR; INTRAVENOUS
Status: DISCONTINUED | OUTPATIENT
Start: 2025-04-22 | End: 2025-04-22 | Stop reason: HOSPADM

## 2025-04-22 RX ORDER — LABETALOL HYDROCHLORIDE 5 MG/ML
5 INJECTION, SOLUTION INTRAVENOUS
Status: DISCONTINUED | OUTPATIENT
Start: 2025-04-22 | End: 2025-04-22 | Stop reason: HOSPADM

## 2025-04-22 RX ORDER — HYDROXYZINE HYDROCHLORIDE 25 MG/1
50 TABLET, FILM COATED ORAL NIGHTLY PRN
Status: DISCONTINUED | OUTPATIENT
Start: 2025-04-22 | End: 2025-04-22 | Stop reason: HOSPADM

## 2025-04-22 RX ORDER — THIAMINE HYDROCHLORIDE 100 MG/ML
100 INJECTION, SOLUTION INTRAMUSCULAR; INTRAVENOUS ONCE
Status: COMPLETED | OUTPATIENT
Start: 2025-04-22 | End: 2025-04-22

## 2025-04-22 RX ORDER — ONDANSETRON 4 MG/1
4 TABLET, ORALLY DISINTEGRATING ORAL EVERY 6 HOURS PRN
Status: DISCONTINUED | OUTPATIENT
Start: 2025-04-22 | End: 2025-04-22 | Stop reason: HOSPADM

## 2025-04-22 RX ORDER — ACETAMINOPHEN 500 MG
1000 TABLET ORAL EVERY 6 HOURS
Status: DISCONTINUED | OUTPATIENT
Start: 2025-04-22 | End: 2025-04-22 | Stop reason: HOSPADM

## 2025-04-22 RX ORDER — PRAZOSIN HYDROCHLORIDE 1 MG/1
5 CAPSULE ORAL 2 TIMES DAILY
Status: DISCONTINUED | OUTPATIENT
Start: 2025-04-22 | End: 2025-04-22 | Stop reason: HOSPADM

## 2025-04-22 RX ORDER — PROPOFOL 10 MG/ML
VIAL (ML) INTRAVENOUS AS NEEDED
Status: DISCONTINUED | OUTPATIENT
Start: 2025-04-22 | End: 2025-04-22 | Stop reason: SURG

## 2025-04-22 RX ORDER — PROMETHAZINE HYDROCHLORIDE 12.5 MG/1
12.5 TABLET ORAL EVERY 6 HOURS PRN
Status: DISCONTINUED | OUTPATIENT
Start: 2025-04-22 | End: 2025-04-22 | Stop reason: HOSPADM

## 2025-04-22 RX ORDER — EPHEDRINE SULFATE/0.9% NACL/PF 25 MG/5 ML
5 SYRINGE (ML) INTRAVENOUS ONCE AS NEEDED
Status: DISCONTINUED | OUTPATIENT
Start: 2025-04-22 | End: 2025-04-22 | Stop reason: HOSPADM

## 2025-04-22 RX ORDER — SODIUM CHLORIDE 9 MG/ML
40 INJECTION, SOLUTION INTRAVENOUS AS NEEDED
Status: DISCONTINUED | OUTPATIENT
Start: 2025-04-22 | End: 2025-04-22 | Stop reason: HOSPADM

## 2025-04-22 RX ORDER — OXYCODONE HYDROCHLORIDE 5 MG/1
5 TABLET ORAL ONCE AS NEEDED
Status: DISCONTINUED | OUTPATIENT
Start: 2025-04-22 | End: 2025-04-22 | Stop reason: HOSPADM

## 2025-04-22 RX ORDER — OXYCODONE HYDROCHLORIDE 5 MG/1
10 TABLET ORAL EVERY 4 HOURS PRN
Status: COMPLETED | OUTPATIENT
Start: 2025-04-22 | End: 2025-04-22

## 2025-04-22 RX ORDER — HYOSCYAMINE SULFATE 0.12 MG/1
125 TABLET SUBLINGUAL EVERY 6 HOURS PRN
Status: DISCONTINUED | OUTPATIENT
Start: 2025-04-22 | End: 2025-04-22 | Stop reason: HOSPADM

## 2025-04-22 RX ORDER — HYDRALAZINE HYDROCHLORIDE 20 MG/ML
20-30 INJECTION INTRAMUSCULAR; INTRAVENOUS
Status: DISCONTINUED | OUTPATIENT
Start: 2025-04-22 | End: 2025-04-22 | Stop reason: HOSPADM

## 2025-04-22 RX ORDER — HYDROMORPHONE HYDROCHLORIDE 2 MG/1
2 TABLET ORAL EVERY 4 HOURS PRN
Status: DISCONTINUED | OUTPATIENT
Start: 2025-04-22 | End: 2025-04-22 | Stop reason: HOSPADM

## 2025-04-22 RX ORDER — VENLAFAXINE HYDROCHLORIDE 150 MG/1
150 CAPSULE, EXTENDED RELEASE ORAL DAILY
COMMUNITY

## 2025-04-22 RX ORDER — SODIUM CHLORIDE 0.9 % (FLUSH) 0.9 %
3 SYRINGE (ML) INJECTION EVERY 12 HOURS SCHEDULED
Status: DISCONTINUED | OUTPATIENT
Start: 2025-04-22 | End: 2025-04-22 | Stop reason: HOSPADM

## 2025-04-22 RX ORDER — SODIUM CHLORIDE, SODIUM LACTATE, POTASSIUM CHLORIDE, CALCIUM CHLORIDE 600; 310; 30; 20 MG/100ML; MG/100ML; MG/100ML; MG/100ML
INJECTION, SOLUTION INTRAVENOUS CONTINUOUS PRN
Status: DISCONTINUED | OUTPATIENT
Start: 2025-04-22 | End: 2025-04-22 | Stop reason: SURG

## 2025-04-22 RX ORDER — ENOXAPARIN SODIUM 100 MG/ML
40 INJECTION SUBCUTANEOUS
Status: DISCONTINUED | OUTPATIENT
Start: 2025-04-23 | End: 2025-04-22 | Stop reason: HOSPADM

## 2025-04-22 RX ORDER — METOCLOPRAMIDE 10 MG/1
10 TABLET ORAL EVERY 6 HOURS PRN
Status: DISCONTINUED | OUTPATIENT
Start: 2025-04-22 | End: 2025-04-22 | Stop reason: HOSPADM

## 2025-04-22 RX ORDER — PROPRANOLOL HYDROCHLORIDE 10 MG/1
10 TABLET ORAL DAILY
Status: DISCONTINUED | OUTPATIENT
Start: 2025-04-22 | End: 2025-04-22 | Stop reason: HOSPADM

## 2025-04-22 RX ORDER — SODIUM CHLORIDE, SODIUM LACTATE, POTASSIUM CHLORIDE, CALCIUM CHLORIDE 600; 310; 30; 20 MG/100ML; MG/100ML; MG/100ML; MG/100ML
100 INJECTION, SOLUTION INTRAVENOUS CONTINUOUS
Status: DISCONTINUED | OUTPATIENT
Start: 2025-04-22 | End: 2025-04-22

## 2025-04-22 RX ORDER — NALOXONE HCL 0.4 MG/ML
0.1 VIAL (ML) INJECTION
Status: DISCONTINUED | OUTPATIENT
Start: 2025-04-22 | End: 2025-04-22 | Stop reason: HOSPADM

## 2025-04-22 RX ORDER — ONDANSETRON 2 MG/ML
4 INJECTION INTRAMUSCULAR; INTRAVENOUS ONCE AS NEEDED
Status: DISCONTINUED | OUTPATIENT
Start: 2025-04-22 | End: 2025-04-22 | Stop reason: HOSPADM

## 2025-04-22 RX ORDER — ROCURONIUM BROMIDE 10 MG/ML
INJECTION, SOLUTION INTRAVENOUS AS NEEDED
Status: DISCONTINUED | OUTPATIENT
Start: 2025-04-22 | End: 2025-04-22 | Stop reason: SURG

## 2025-04-22 RX ORDER — DIPHENHYDRAMINE HYDROCHLORIDE 50 MG/ML
12.5 INJECTION, SOLUTION INTRAMUSCULAR; INTRAVENOUS ONCE AS NEEDED
Status: DISCONTINUED | OUTPATIENT
Start: 2025-04-22 | End: 2025-04-22 | Stop reason: HOSPADM

## 2025-04-22 RX ORDER — DEXAMETHASONE SODIUM PHOSPHATE 4 MG/ML
INJECTION, SOLUTION INTRA-ARTICULAR; INTRALESIONAL; INTRAMUSCULAR; INTRAVENOUS; SOFT TISSUE AS NEEDED
Status: DISCONTINUED | OUTPATIENT
Start: 2025-04-22 | End: 2025-04-22 | Stop reason: SURG

## 2025-04-22 RX ORDER — OXYCODONE AND ACETAMINOPHEN 5; 325 MG/1; MG/1
1 TABLET ORAL EVERY 6 HOURS PRN
Qty: 28 TABLET | Refills: 0 | Status: SHIPPED | OUTPATIENT
Start: 2025-04-22 | End: 2025-04-29

## 2025-04-22 RX ORDER — OXCARBAZEPINE 600 MG/1
600 TABLET, FILM COATED ORAL 2 TIMES DAILY
Status: DISCONTINUED | OUTPATIENT
Start: 2025-04-22 | End: 2025-04-22 | Stop reason: HOSPADM

## 2025-04-22 RX ORDER — NIFEDIPINE 30 MG/1
30 TABLET, EXTENDED RELEASE ORAL DAILY
COMMUNITY

## 2025-04-22 RX ORDER — FAMOTIDINE 10 MG/ML
20 INJECTION, SOLUTION INTRAVENOUS EVERY 12 HOURS SCHEDULED
Status: DISCONTINUED | OUTPATIENT
Start: 2025-04-22 | End: 2025-04-22 | Stop reason: HOSPADM

## 2025-04-22 RX ORDER — FLUMAZENIL 0.1 MG/ML
0.2 INJECTION INTRAVENOUS AS NEEDED
Status: DISCONTINUED | OUTPATIENT
Start: 2025-04-22 | End: 2025-04-22 | Stop reason: HOSPADM

## 2025-04-22 RX ORDER — FENTANYL CITRATE 50 UG/ML
50 INJECTION, SOLUTION INTRAMUSCULAR; INTRAVENOUS
Status: DISCONTINUED | OUTPATIENT
Start: 2025-04-22 | End: 2025-04-22 | Stop reason: HOSPADM

## 2025-04-22 RX ORDER — ONDANSETRON 2 MG/ML
INJECTION INTRAMUSCULAR; INTRAVENOUS AS NEEDED
Status: DISCONTINUED | OUTPATIENT
Start: 2025-04-22 | End: 2025-04-22 | Stop reason: SURG

## 2025-04-22 RX ORDER — SCOPOLAMINE 1 MG/3D
PATCH, EXTENDED RELEASE TRANSDERMAL AS NEEDED
Status: DISCONTINUED | OUTPATIENT
Start: 2025-04-22 | End: 2025-04-22 | Stop reason: SURG

## 2025-04-22 RX ADMIN — SODIUM CHLORIDE, SODIUM LACTATE, POTASSIUM CHLORIDE, CALCIUM CHLORIDE 500 ML: 20; 30; 600; 310 INJECTION, SOLUTION INTRAVENOUS at 06:20

## 2025-04-22 RX ADMIN — SUGAMMADEX 100 MG: 100 INJECTION, SOLUTION INTRAVENOUS at 08:47

## 2025-04-22 RX ADMIN — CEFAZOLIN 2000 MG: 2 INJECTION, POWDER, FOR SOLUTION INTRAMUSCULAR; INTRAVENOUS at 07:22

## 2025-04-22 RX ADMIN — FENTANYL CITRATE 50 MCG: 50 INJECTION, SOLUTION INTRAMUSCULAR; INTRAVENOUS at 09:48

## 2025-04-22 RX ADMIN — SODIUM CHLORIDE, POTASSIUM CHLORIDE, SODIUM LACTATE AND CALCIUM CHLORIDE 100 ML/HR: 600; 310; 30; 20 INJECTION, SOLUTION INTRAVENOUS at 15:10

## 2025-04-22 RX ADMIN — HYOSCYAMINE SULFATE 125 MCG: 0.12 TABLET SUBLINGUAL at 09:37

## 2025-04-22 RX ADMIN — OXYCODONE 10 MG: 5 TABLET ORAL at 09:49

## 2025-04-22 RX ADMIN — DEXAMETHASONE SODIUM PHOSPHATE 4 MG: 4 INJECTION, SOLUTION INTRAMUSCULAR; INTRAVENOUS at 07:46

## 2025-04-22 RX ADMIN — DEXMEDETOMIDINE HYDROCHLORIDE 4 MCG: 400 INJECTION INTRAVENOUS at 08:38

## 2025-04-22 RX ADMIN — ROCURONIUM BROMIDE 50 MG: 10 INJECTION, SOLUTION INTRAVENOUS at 07:31

## 2025-04-22 RX ADMIN — SUGAMMADEX 200 MG: 100 INJECTION, SOLUTION INTRAVENOUS at 08:40

## 2025-04-22 RX ADMIN — THIAMINE HYDROCHLORIDE 100 MG: 100 INJECTION, SOLUTION INTRAMUSCULAR; INTRAVENOUS at 15:04

## 2025-04-22 RX ADMIN — SODIUM CHLORIDE, SODIUM LACTATE, POTASSIUM CHLORIDE, AND CALCIUM CHLORIDE: .6; .31; .03; .02 INJECTION, SOLUTION INTRAVENOUS at 07:27

## 2025-04-22 RX ADMIN — ROCURONIUM BROMIDE 20 MG: 10 INJECTION, SOLUTION INTRAVENOUS at 07:50

## 2025-04-22 RX ADMIN — GLYCOPYRROLATE 0.2 MG: 0.2 INJECTION INTRAMUSCULAR; INTRAVENOUS at 07:58

## 2025-04-22 RX ADMIN — GLYCOPYRROLATE 0.2 MG: 0.2 INJECTION INTRAMUSCULAR; INTRAVENOUS at 07:56

## 2025-04-22 RX ADMIN — ACETAMINOPHEN 1000 MG: 500 TABLET, FILM COATED ORAL at 15:04

## 2025-04-22 RX ADMIN — ONDANSETRON 4 MG: 2 INJECTION, SOLUTION INTRAMUSCULAR; INTRAVENOUS at 08:38

## 2025-04-22 RX ADMIN — PROPOFOL 200 MG: 10 INJECTION, EMULSION INTRAVENOUS at 07:31

## 2025-04-22 RX ADMIN — FENTANYL CITRATE 50 MCG: 50 INJECTION, SOLUTION INTRAMUSCULAR; INTRAVENOUS at 09:12

## 2025-04-22 RX ADMIN — SODIUM CHLORIDE, POTASSIUM CHLORIDE, SODIUM LACTATE AND CALCIUM CHLORIDE 100 ML/HR: 600; 310; 30; 20 INJECTION, SOLUTION INTRAVENOUS at 07:22

## 2025-04-22 RX ADMIN — SCOPALAMINE 1 PATCH: 1 PATCH, EXTENDED RELEASE TRANSDERMAL at 06:26

## 2025-04-22 RX ADMIN — HYDROMORPHONE HYDROCHLORIDE 1 MG: 1 INJECTION, SOLUTION INTRAMUSCULAR; INTRAVENOUS; SUBCUTANEOUS at 09:25

## 2025-04-22 RX ADMIN — SCOPOLAMINE 1 PATCH: 1.5 PATCH, EXTENDED RELEASE TRANSDERMAL at 15:04

## 2025-04-22 RX ADMIN — ROCURONIUM BROMIDE 20 MG: 10 INJECTION, SOLUTION INTRAVENOUS at 08:29

## 2025-04-22 RX ADMIN — 0.12% CHLORHEXIDINE GLUCONATE 15 ML: 1.2 RINSE ORAL at 06:22

## 2025-04-22 RX ADMIN — DEXMEDETOMIDINE HYDROCHLORIDE 4 MCG: 400 INJECTION INTRAVENOUS at 08:01

## 2025-04-22 RX ADMIN — HYDROMORPHONE HYDROCHLORIDE 1 MG: 1 INJECTION, SOLUTION INTRAMUSCULAR; INTRAVENOUS; SUBCUTANEOUS at 13:46

## 2025-04-22 RX ADMIN — DEXMEDETOMIDINE HYDROCHLORIDE 4 MCG: 400 INJECTION INTRAVENOUS at 08:12

## 2025-04-22 RX ADMIN — HYDROMORPHONE HYDROCHLORIDE 0.5 MG: 1 INJECTION, SOLUTION INTRAMUSCULAR; INTRAVENOUS; SUBCUTANEOUS at 08:03

## 2025-04-22 RX ADMIN — FENTANYL CITRATE 100 MCG: 50 INJECTION, SOLUTION INTRAMUSCULAR; INTRAVENOUS at 07:31

## 2025-04-22 RX ADMIN — HYDROMORPHONE HYDROCHLORIDE 0.5 MG: 1 INJECTION, SOLUTION INTRAMUSCULAR; INTRAVENOUS; SUBCUTANEOUS at 07:54

## 2025-04-22 RX ADMIN — PROPOFOL 75 MCG/KG/MIN: 10 INJECTION, EMULSION INTRAVENOUS at 08:17

## 2025-04-22 NOTE — ANESTHESIA PROCEDURE NOTES
Airway  Reason: elective    Date/Time: 4/22/2025 7:33 AM  Airway not difficult    General Information and Staff    Patient location during procedure: OR    Indications and Patient Condition  Indications for airway management: airway protection    Preoxygenated: yes    Mask difficulty assessment: 2 - vent by mask + OA or adjuvant +/- NMBA    Final Airway Details    Final airway type: endotracheal airway      Successful airway: ETT  Cuffed: yes   Successful intubation technique: video laryngoscopy  Adjuncts used in placement: intubating stylet  Endotracheal tube insertion site: oral  Blade: CMAC  Blade size: 3  ETT size (mm): 7.5  Cormack-Lehane Classification: grade I - full view of glottis  Placement verified by: chest auscultation and capnometry   Cuff volume (mL): 8  Measured from: gums  ETT/EBT to gums (cm): 23  Number of attempts at approach: 1  Assessment: lips, teeth, and gum same as pre-op and atraumatic intubation

## 2025-04-22 NOTE — PLAN OF CARE
Goal Outcome Evaluation:   Patient a&0x4, pain controlled, pleasant, discharging to home. Call light within reach, bed in low position.

## 2025-04-22 NOTE — ANESTHESIA POSTPROCEDURE EVALUATION
Patient: Vikash Mccarty    Procedure Summary       Date: 04/22/25 Room / Location: [facility] OR 09 / [facility] MAIN OR    Anesthesia Start: 0727 Anesthesia Stop: 0855    Procedure: LAPAROSCOPIC HIATAL HERNIA REPAIR WITH DAVINCI ROBOT Diagnosis:       Hiatal hernia with GERD      (Hiatal hernia with GERD [K44.9, K21.9])    Surgeons: Ryann Santos MD Provider: Alvino Crum MD    Anesthesia Type: general ASA Status: 3            Anesthesia Type: general    Vitals  Vitals Value Taken Time   /95 04/22/25 11:38   Temp 98.1 °F (36.7 °C) 04/22/25 11:38   Pulse 95 04/22/25 11:38   Resp 15 04/22/25 11:38   SpO2 96 % 04/22/25 11:38           Post Anesthesia Care and Evaluation    Patient location during evaluation: PACU  Patient participation: complete - patient participated  Level of consciousness: awake  Pain scale: See nurse's notes for pain score.  Pain management: adequate    Airway patency: patent  Anesthetic complications: No anesthetic complications  PONV Status: none  Cardiovascular status: acceptable  Respiratory status: acceptable and spontaneous ventilation  Hydration status: acceptable    Comments: Patient seen and examined postoperatively; vital signs stable; SpO2 greater than or equal to 90%; cardiopulmonary status stable; nausea/vomiting adequately controlled; pain adequately controlled; no apparent anesthesia complications; patient discharged from anesthesia care when discharge criteria were met

## 2025-04-22 NOTE — OP NOTE
LAPAROSCOPIC HIATAL HERNIA REPAIR WITH DAVINCI ROBOT  Procedure Report    Patient Name:  Vikash Mccarty  YOB: 1975    Date of Surgery:  4/22/2025     Indications: This patient presents with hiatal hernia and GERD and also has a history of gastric sleeve    Pre-op Diagnosis:   Hiatal hernia with GERD    Postop diagnosis  Hiatal hernia with GERD      Procedure/CPT® Codes:  No CPT Code Applied in Case Entry    Procedure(s):  LAPAROSCOPIC HIATAL HERNIA REPAIR WITH DAVINCI ROBOT    Staff:  Surgeon(s):  Ryann Santos MD    Assistant: Thelma Schwab APRN  Assistant: Thelma Schwab APRN  was responsible for performing the following activities: Retraction, Suction, Irrigation, Suturing, Closing, and Placing Dressing and their skilled assistance was necessary for the success of this case.   Anesthesia: General    Estimated Blood Loss: minmal    Implants:    Implant Name Type Inv. Item Serial No.  Lot No. LRB No. Used Action   DEV CLS WND VLOC/180 [place] ABS 1/2CIR V20 SZ3/0 26MM 30CM GRN - [id number] Implant DEV CLS WND VLOC/180 [place] ABS 1/2CIR V20 SZ3/0 26MM 30CM GRN  COVIDIEN [id number] N/A 3 Implanted   DEV CLS WND VLOC/PBT [place] NONABS 1/2CIR SZ2/0 27MM 15CM NICOLETTE - [id number] Implant DEV CLS WND VLOC/PBT [place] NONABS 1/2CIR SZ2/0 27MM 15CM NICOLETTE  COVIDIEN [id number] N/A 1 Implanted   DEV CLS WND VLOC/PBT NONABS TPR/PT 1/[id number] SZ1 45CM NICOLETTE - [id number] Implant DEV CLS WND VLOC/PBT NONABS TPR/PT 1/[id number] SZ1 45CM NICOLETTE  COVIDIEN [id number] N/A 1 Implanted       Specimen:          None        Findings:     Complications: none    Description of Procedure:  General endotracheal anesthesia was applied and a timeout was performed.  The abdomen was prepped and draped in a sterile fashion.  An Optiview technique was used to enter the peritoneum with an 8 mm trocar in the left mid abdomen.  The peritoneum was insufflated to 12 mmHg and no apparent injury was seen.  The 12 mm trocar was placed in the right upper  quadrant and then another 8 mm trocar was placed in the midline and also the left upper quadrant.  The trocars were docked to the da Ciro robot and I took control the surgical console.  A #1 V-Loc suture was used to create a liver retractor stitch by placing the suture to the anterior peritoneum and then to the anterior hiatus and into the anterior peritoneum again.  I mobilized the lower esophagus and upper stomach from the crura, the aorta and the pericardium.  I did this using the vessel sealer and also used the tip up device and a fenestrated bipolar.  The vagus nerves were identified and unharmed.  A 2-0 V-Loc nonabsorbable suture was used to close the posterior hiatus in a running fashion and then that stitch was also used to incorporate the upper posterior stomach to act as a gastropexy.  A 0 Ethibond was used to close the anterior hiatus in a figure-of-eight fashion.  All of this was done while a 40 Thai bougie was in place.  I then performed an omentopexy with a 3-0 V-Loc absorbable suture to the entire stomach on the left side to help stabilize the stomach and prevent hiatal hernia recurrence.  All sutures and sponges were removed and the counts were correct.  The trocars were removed and the skin was closed with 4-0 Monocryl.    Ryann Santos MD     Date: 4/22/2025  Time: 08:34 EDT

## 2025-04-23 NOTE — CASE MANAGEMENT/SOCIAL WORK
Case Management Discharge Note      Final Note: Routine home         Selected Continued Care - Discharged on 4/22/2025 Admission date: 4/22/2025 - Discharge disposition: Home or Self Care           Discharged prior to case management assessment               Transportation Services  Private: Car    Final Discharge Disposition Code: 01 - home or self-care

## 2025-04-30 NOTE — PROGRESS NOTES
[facility]K BAR SURG [facility] BARIATRIC SURGERY  [address] [place] [address]  [address] [place] [address]  Dept: [phone]  12/15/2022      Vikash Mccarty.  [id number]  [id number]  1975  male    Date of last surgery: 11/9/2022Gastric Sleeve Laparoscopic With Davinci Robot      Chief Complaint: BH Post-Op Bariatric Surgery:   Vikash Mccarty is status post procedure listed above  HPI:     Wt Readings from Last 10 Encounters:   12/15/22 119 kg (262 lb)   11/14/22 129 kg (285 lb 3.2 oz)   11/09/22 133 kg (292 lb 12.8 oz)   11/04/22 135 kg (298 lb 6.4 oz)   01/27/22 (!) 144 kg (316 lb 12.8 oz)   01/14/20 (!) 155 kg (342 lb 3.2 oz)   10/22/19 (!) 154 kg (338 lb 8 oz)        Today's weight is 119 kg (262 lb) pounds,@ has a  loss of 23 pounds since the last visit and@ weight loss since surgery is 30 pounds. The patient reports a decreased portion size and loss of appetite.      Vikash Mccarty denies nausea and vomiting, having some dry heaving prn, having some dysphagia , no GERD if taking ppi      Diet and Exercise: Diet history reviewed and discussed with the patient. Weight loss/gains to date discussed with the patient.     He reports eating 2 meals per day, a typical portion size of 1/4-1/2 cup, eating 1 snacks per day, drinking 8 or more 8-oz. glasses of water per day, no carbonated beverage consumption and exercising regularly.       The patient states they are eating 40 grams of protein per day.     January 6th - back surgery       Breakfast: scrambled egg   Chicken and veggie   Snacks: fruit   Drinks: water , no carbonation , 1/2 and 1/2 sweet tea   Exercise: walking, needing back surgery in January     Not able to tolerate protein shakes     [facility]     Review of Systems   Constitutional: Positive for activity change and appetite change.   Respiratory: Negative.    Cardiovascular: Negative.   "  Gastrointestinal:        Dysphagia    Musculoskeletal: Positive for back pain.         Patient Active Problem List   Diagnosis   • Class 3 severe obesity with body mass index (BMI) of 45.0 to 49.9 in adult ([facility])   • Essential hypertension   • Chronic fatigue   • Insomnia   • Hyperlipidemia   • Sleep apnea   • GERD (gastroesophageal reflux disease)   • Depression with anxiety   • Bipolar 1 disorder ([facility])   • Hypothyroidism   • Class 3 severe obesity with serious comorbidity and body mass index (BMI) of 45.0 to 49.9 in adult ([facility])   • Gastroesophageal reflux disease   • Obstructive sleep apnea syndrome   • Obesity, Class III, BMI 40-49.9 (morbid obesity) ([facility])       Past Medical History:   Diagnosis Date   • Anxiety and depression    • Bipolar 1 disorder ([facility])    • Chest pain     \"NON CARDIAC\"   [facility]   • Disease of thyroid gland    • GERD (gastroesophageal reflux disease)    • Hyperlipidemia    • Hypertension    • Migraines    • Morbid obesity ([facility])    • Night terrors     cautious to wake up   • Paranoid schizophrenia in remission ([facility])    • Sleep apnea     no machine   • Slow to wake up after anesthesia      Past Surgical History:   Procedure Laterality Date   • COLONOSCOPY  2019   • ENDOSCOPY N/A 01/14/2020    Procedure: ESOPHAGOGASTRODUODENOSCOPY WITH BIOPSY;  Surgeon: Kenrick Zambrano Jr., MD;  Location: [facility] ENDOSCOPY;  Service: General   • CORONARY STENT PLACEMENT Right 05/12/2022    Right Coronary artery   • GASTRIC SLEEVE LAPAROSCOPIC N/A 11/9/2022    Procedure: GASTRIC SLEEVE LAPAROSCOPIC WITH DAVINCI ROBOT;  Surgeon: Ryann Santos MD;  Location: [facility];  Service: Robotics - DaVinci;  Laterality: N/A;   • TYMPANOSTOMY TUBE PLACEMENT      leveled ear drum and fixed a hole (2019) reconstructed 99% of ear drum in right ear      The following portions of the patient's history were reviewed and updated as appropriate: allergies, current medications, past family history, past " social history, past surgical history and problem list.    Vitals:    12/15/22 1354   BP: 142/90   Pulse: 78   SpO2: 96%       Physical Exam  Constitutional:       Appearance: Normal appearance. He is obese.   Cardiovascular:      Pulses: Normal pulses.   Pulmonary:      Effort: Pulmonary effort is normal.   Abdominal:      General: Abdomen is flat.      Palpations: Abdomen is soft.   Skin:     General: Skin is warm and dry.   Neurological:      General: No focal deficit present.      Mental Status: He is alert and oriented to person, place, and time.   Psychiatric:         Mood and Affect: Mood normal.         Behavior: Behavior normal.         Thought Content: Thought content normal.         Judgment: Judgment normal.             Assessment:     Post-op, the patient is doing well. He has been having some dysphagia with more solid foods. I encouraged pt to chew food well, eat slowly. Offered to schedule pt for EGD with possible dilation, but pt declined.  Pt to call office if symptoms get worse and will plan for EGD then. Ok to resume solid foods and ok to start exercising. Plan to check labs and follow up in 2 months.     Pt is having back surgery on January 6th.   Plan:     Encouraged patient to be sure to get plenty of lean protein per day through small frequent meals all with a protein source.   Activity restrictions: none.   Recommended patient be sure to get at least 70 grams of protein per day by eating small, frequent meals all with high lean protein choices. Be sure to limit/cut back on daily carbohydrate intake. Discussed with the patient the recommended amount of water per day to intake- half of body weight in ounces. Reviewed vitamin requirements. Be sure to do routine exercise, 150 minutes per week minimum, including both cardio and strength training.     Instructions / Recommendations: dietary counseling recommended, recommended a daily protein intake of  grams, vitamin supplement(s)  recommended, recommended exercising at least 150 minutes per week, behavior modifications recommended and instructed to call the office for concerns, questions, or problems.     The patient was instructed to follow up in 2 months.     The patient was counseled regarding dysphagia. Total time spent face to face was 20 minutes and 15 minutes was spent counseling.     KEDAR Martinez  [facility] Bariatrics    [Joint Pain] : joint pain [Joint Stiffness] : joint stiffness [Joint Swelling] : joint swelling [Arthralgia] : no arthralgia

## 2025-05-06 ENCOUNTER — OFFICE VISIT (OUTPATIENT)
Dept: BARIATRICS/WEIGHT MGMT | Facility: CLINIC | Age: 50
End: 2025-05-06
Payer: OTHER GOVERNMENT

## 2025-05-06 VITALS
WEIGHT: 250 LBS | OXYGEN SATURATION: 96 % | HEIGHT: 70 IN | HEART RATE: 80 BPM | SYSTOLIC BLOOD PRESSURE: 132 MMHG | BODY MASS INDEX: 35.79 KG/M2 | DIASTOLIC BLOOD PRESSURE: 86 MMHG

## 2025-05-06 DIAGNOSIS — Z51.89 VISIT FOR WOUND CHECK: ICD-10-CM

## 2025-05-06 DIAGNOSIS — Z87.19 H/O HIATAL HERNIA: Primary | ICD-10-CM

## 2025-05-06 PROCEDURE — 99024 POSTOP FOLLOW-UP VISIT: CPT | Performed by: NURSE PRACTITIONER

## 2025-05-06 NOTE — PROGRESS NOTES
"[facility] BAR SURG [facility] BARIATRIC SURGERY  [address] [place] [address]  [address] [place] [address]  Dept: [phone]  5/6/2025      Vikash Mccarty.  [id number]  [id number]  1975  male      Chief Complaint   Patient presents with    Post-op     2W HH 4/22/2025     Laparoscopic Hiatal Hernia Repair With Davinci Robot  4/22/2025     Post-Op Bariatric Surgery:     HPI:   Weight loss in the last week:+ 7 pounds   Minimal abdominal soreness reported  No nausea or vomiting  Back on regular foods and tolerating po fluids well       Wt Readings from Last 10 Encounters:   05/06/25 113 kg (250 lb)   04/22/25 110 kg (243 lb 9.6 oz)   04/10/25 111 kg (245 lb 1.6 oz)   12/04/23 99.4 kg (219 lb 3.2 oz)   02/13/23 104 kg (230 lb 2.6 oz)   02/08/23 105 kg (231 lb 9.6 oz)   12/15/22 119 kg (262 lb)   11/14/22 129 kg (285 lb 3.2 oz)   11/09/22 133 kg (292 lb 12.8 oz)   11/04/22 135 kg (298 lb 6.4 oz)       Review of Systems   Constitutional:  Positive for activity change and appetite change.   Respiratory: Negative.     Cardiovascular: Negative.    Gastrointestinal: Negative.    Musculoskeletal:  Positive for back pain.         Past Medical History:   Diagnosis Date    Anxiety and depression     Bipolar 1 disorder     Chest pain     \"NON CARDIAC\"   [facility]    Coronary artery disease     Disease of thyroid gland     GERD (gastroesophageal reflux disease)     Hyperlipidemia     Hypertension     Migraines     Morbid obesity     Night terrors     cautious to wake up    Paranoid schizophrenia in remission     Sleep apnea     no machine    Slow to wake up after anesthesia      Past Surgical History:   Procedure Laterality Date    COLONOSCOPY  2019    CORONARY STENT PLACEMENT Right 05/12/2022    Right Coronary artery    ENDOSCOPY N/A 01/14/2020    Procedure: ESOPHAGOGASTRODUODENOSCOPY WITH BIOPSY;  Surgeon: Kenrick Zambrano Jr., MD;  Location: "  [facility];  Service: General    ENDOSCOPY N/A 02/13/2023    Procedure: ESOPHAGOGASTRODUODENOSCOPY with biopsy gastric antrum, balloon dilation pylorus and gastric midbody (18-20mm non-wire-guided balloon);  Surgeon: Ryann Santos MD;  Location: [facility] ENDOSCOPY;  Service: General;  Laterality: N/A;  post: gastritis, small hiatal hernia    GASTRIC SLEEVE LAPAROSCOPIC N/A 11/09/2022    Procedure: GASTRIC SLEEVE LAPAROSCOPIC WITH DAVINCI ROBOT;  Surgeon: Ryann Santos MD;  Location: [facility] MAIN OR;  Service: Robotics - DaVinci;  Laterality: N/A;    HIATAL HERNIA REPAIR N/A 4/22/2025    Procedure: LAPAROSCOPIC HIATAL HERNIA REPAIR WITH DAVINCI ROBOT;  Surgeon: Ryann Santos MD;  Location: [facility] MAIN OR;  Service: Robotics - DaVinci;  Laterality: N/A;    PACEMAKER IMPLANTATION  11/2023    [facility]    SPINAL CORD DECOMPRESSION  02/04/2022    TYMPANOSTOMY TUBE PLACEMENT      leveled ear drum and fixed a hole (2019) reconstructed 99% of ear drum in right ear         The following portions of the patient's history were reviewed and updated as appropriate: allergies, current medications, past family history, past medical history, past social history, past surgical history, and problem list.    Vitals:    05/06/25 1110   BP: 132/86   Pulse: 80   SpO2: 96%       Physical Exam  Constitutional:       Appearance: Normal appearance. He is obese.   Pulmonary:      Effort: Pulmonary effort is normal.   Abdominal:      General: Abdomen is flat.      Comments: Incisions clean, dry and intact    Skin:     General: Skin is warm and dry.   Neurological:      General: No focal deficit present.      Mental Status: He is alert and oriented to person, place, and time.   Psychiatric:         Mood and Affect: Mood normal.         Behavior: Behavior normal.         Thought Content: Thought content normal.         Judgment: Judgment normal.           Assessment:   Patient Active Problem List   Diagnosis    Class 3  severe obesity with body mass index (BMI) of 45.0 to 49.9 in adult    Essential hypertension    Chronic fatigue    Insomnia    Hyperlipidemia    Sleep apnea    GERD (gastroesophageal reflux disease)    Depression with anxiety    Bipolar 1 disorder    Hypothyroidism    Class 3 severe obesity with serious comorbidity and body mass index (BMI) of 45.0 to 49.9 in adult    Gastroesophageal reflux disease    Obstructive sleep apnea syndrome    Obesity, Class III, BMI 40-49.9 (morbid obesity)    Dysphagia    Hiatal hernia with GERD    Morbid obesity       Post-op, the patient is doing well. He is 2 weeks post hiatal hernia repair.He states his gerd has improved. No nausea or vomiting reported. Minimal abdominal pain reported. Tolerating po fluids and regular foods well. Lifting restrictions 25 pounds for 1 more week. Plan to follow up in Nov for his 3 yr post gastric sleeve follow up. Encourage pt to try and get 60 grams of protein in his diet a day and try tracking dietary intake via irving like [employer] pal or [employer].     Plan:   Reviewed with patient the importance of following the manual for diet progression. Increase activity as tolerated. Continue increasing daily intake of protein and water.   Return to work: depending on pt's job and job requirements - this was discussed individually with each patient  Activity restrictions: no lifting, pushing or pulling over 25lbs for 3 weeks.   Recommended patient be sure to get at least 70 grams of protein per day. Discussed with the patient the recommended amount of water per day to intake. Reviewed vitamin requirements. Be sure to do routine exercise and increase activity as tolerated.  Patient may use miralax as needed if necessary.     Instructions / Recommendations: dietary counseling recommended, recommended a daily protein intake of  grams, vitamin supplement(s) recommended, recommended exercising at least 150 minutes per week, behavior modifications recommended  and instructed to call the office for concerns, questions, or problems.     The patient was instructed to follow in Nov for his 3 yr post gastric sleeve follow up.         KEDAR Martinez  [facility] bariatrics

## 2025-08-11 ENCOUNTER — HOSPITAL ENCOUNTER (EMERGENCY)
Facility: HOSPITAL | Age: 50
Discharge: HOME | End: 2025-08-11
Admitting: EMERGENCY MEDICINE
Payer: OTHER GOVERNMENT

## 2025-08-11 ENCOUNTER — APPOINTMENT (OUTPATIENT)
Dept: CARDIOLOGY | Facility: HOSPITAL | Age: 50
End: 2025-08-11
Payer: OTHER GOVERNMENT

## 2025-08-11 VITALS
OXYGEN SATURATION: 96 % | DIASTOLIC BLOOD PRESSURE: 95 MMHG | TEMPERATURE: 98 F | HEART RATE: 72 BPM | SYSTOLIC BLOOD PRESSURE: 164 MMHG | RESPIRATION RATE: 20 BRPM

## 2025-08-11 DIAGNOSIS — R03.0 ELEVATED BLOOD PRESSURE READING: ICD-10-CM

## 2025-08-11 DIAGNOSIS — M54.42 LEFT-SIDED LOW BACK PAIN WITH LEFT-SIDED SCIATICA, UNSPECIFIED CHRONICITY: Primary | ICD-10-CM

## 2025-08-11 LAB
BH CV LOWER VASCULAR LEFT COMMON FEMORAL AUGMENT: NORMAL
BH CV LOWER VASCULAR LEFT COMMON FEMORAL COMPETENT: NORMAL
BH CV LOWER VASCULAR LEFT COMMON FEMORAL COMPRESS: NORMAL
BH CV LOWER VASCULAR LEFT COMMON FEMORAL PHASIC: NORMAL
BH CV LOWER VASCULAR LEFT COMMON FEMORAL SPONT: NORMAL
BH CV LOWER VASCULAR LEFT DISTAL FEMORAL COMPRESS: NORMAL
BH CV LOWER VASCULAR LEFT GASTRONEMIUS COMPRESS: NORMAL
BH CV LOWER VASCULAR LEFT GREATER SAPH AK COMPRESS: NORMAL
BH CV LOWER VASCULAR LEFT GREATER SAPH BK COMPRESS: NORMAL
BH CV LOWER VASCULAR LEFT LESSER SAPH COMPRESS: NORMAL
BH CV LOWER VASCULAR LEFT MID FEMORAL AUGMENT: NORMAL
BH CV LOWER VASCULAR LEFT MID FEMORAL COMPETENT: NORMAL
BH CV LOWER VASCULAR LEFT MID FEMORAL COMPRESS: NORMAL
BH CV LOWER VASCULAR LEFT MID FEMORAL PHASIC: NORMAL
BH CV LOWER VASCULAR LEFT MID FEMORAL SPONT: NORMAL
BH CV LOWER VASCULAR LEFT PERONEAL COMPRESS: NORMAL
BH CV LOWER VASCULAR LEFT POPLITEAL AUGMENT: NORMAL
BH CV LOWER VASCULAR LEFT POPLITEAL COMPETENT: NORMAL
BH CV LOWER VASCULAR LEFT POPLITEAL COMPRESS: NORMAL
BH CV LOWER VASCULAR LEFT POPLITEAL PHASIC: NORMAL
BH CV LOWER VASCULAR LEFT POPLITEAL SPONT: NORMAL
BH CV LOWER VASCULAR LEFT POSTERIOR TIBIAL COMPRESS: NORMAL
BH CV LOWER VASCULAR LEFT PROXIMAL FEMORAL COMPRESS: NORMAL
BH CV LOWER VASCULAR LEFT SAPHENOFEMORAL JUNCTION COMPRESS: NORMAL
BH CV LOWER VASCULAR RIGHT COMMON FEMORAL AUGMENT: NORMAL
BH CV LOWER VASCULAR RIGHT COMMON FEMORAL COMPETENT: NORMAL
BH CV LOWER VASCULAR RIGHT COMMON FEMORAL COMPRESS: NORMAL
BH CV LOWER VASCULAR RIGHT COMMON FEMORAL PHASIC: NORMAL
BH CV LOWER VASCULAR RIGHT COMMON FEMORAL SPONT: NORMAL

## 2025-08-11 PROCEDURE — 96372 THER/PROPH/DIAG INJ SC/IM: CPT

## 2025-08-11 PROCEDURE — 99284 EMERGENCY DEPT VISIT MOD MDM: CPT

## 2025-08-11 PROCEDURE — 93971 EXTREMITY STUDY: CPT

## 2025-08-11 PROCEDURE — 93971 EXTREMITY STUDY: CPT | Performed by: SURGERY

## 2025-08-11 PROCEDURE — 97161 PT EVAL LOW COMPLEX 20 MIN: CPT | Performed by: PHYSICAL THERAPIST

## 2025-08-11 PROCEDURE — 25010000002 MORPHINE PER 10 MG

## 2025-08-11 RX ORDER — METHOCARBAMOL 750 MG/1
750 TABLET, FILM COATED ORAL ONCE
Status: COMPLETED | OUTPATIENT
Start: 2025-08-11 | End: 2025-08-11

## 2025-08-11 RX ORDER — OXYCODONE HYDROCHLORIDE 5 MG/1
5 TABLET ORAL ONCE
Refills: 0 | Status: COMPLETED | OUTPATIENT
Start: 2025-08-11 | End: 2025-08-11

## 2025-08-11 RX ADMIN — MORPHINE SULFATE 4 MG: 4 INJECTION, SOLUTION INTRAMUSCULAR; INTRAVENOUS at 12:45

## 2025-08-11 RX ADMIN — OXYCODONE 5 MG: 5 TABLET ORAL at 15:54

## 2025-08-11 RX ADMIN — METHOCARBAMOL TABLETS 750 MG: 750 TABLET, COATED ORAL at 15:54

## (undated) DEVICE — PK BARIATRIC 50

## (undated) DEVICE — TIP COVER ACCESSORY

## (undated) DEVICE — TP SXN YANKR BULB STRL

## (undated) DEVICE — SYRINGE,TOOMEY,IRRIGATION,70CC,STERILE: Brand: MEDLINE

## (undated) DEVICE — REDUCER: Brand: ENDOWRIST

## (undated) DEVICE — SUT SILK 2/0 SH 30IN K833H

## (undated) DEVICE — COLUMN DRAPE

## (undated) DEVICE — BLADELESS OBTURATOR: Brand: WECK VISTA

## (undated) DEVICE — SLV SCD CALF HEMOFORCE DVT THERP REPROC MD

## (undated) DEVICE — TROC BLADLES AIRSEAL/OPTI THRD 8X120MM 1P/U

## (undated) DEVICE — ARM DRAPE

## (undated) DEVICE — DEV INFL CRE STERIFLATE 60CC DISP

## (undated) DEVICE — SUT SILK 2/0 FS BLK 18IN 685G

## (undated) DEVICE — ABDOMINAL BINDER: Brand: DEROYAL

## (undated) DEVICE — PASS SUT PRO BARIATRIC XL W/TROC SWABS

## (undated) DEVICE — SOLUTION,WATER,IRRIGATION,1000ML,STERILE: Brand: MEDLINE

## (undated) DEVICE — KT SURG TURNOVER 050

## (undated) DEVICE — PK ENDO GI 50

## (undated) DEVICE — ST TBG AIRSEAL BIF FLTR W/ACT/CHARCOAL/FLTR

## (undated) DEVICE — GOWN,REINF,POLY,ECL,PP SLV,XL,XLONG: Brand: MEDLINE

## (undated) DEVICE — TUBING, SUCTION, 1/4" X 12', STRAIGHT: Brand: MEDLINE

## (undated) DEVICE — SENSR O2 OXIMAX FNGR A/ 18IN NONSTR

## (undated) DEVICE — ORG INST STRIP/TS ADHS 2X10IN YEL STRL

## (undated) DEVICE — GLV SURG SIGNATURE ESSENTIAL PF LTX SZ7.5

## (undated) DEVICE — SYR LUERLOK 50ML

## (undated) DEVICE — SEAL

## (undated) DEVICE — MSK PROC CURAPLEX O2 2/ADAPT 7FT

## (undated) DEVICE — GLOVE,SURG,SENSICARE SLT,LF,PF,7.5: Brand: MEDLINE

## (undated) DEVICE — ENDOPATH XCEL WITH OPTIVIEW TECHNOLOGY BLADELESS TROCARS WITH STABILITY SLEEVES: Brand: ENDOPATH XCEL OPTIVIEW

## (undated) DEVICE — STPLR TRAIN 60 SUREFORM DAVINCI/X/XI

## (undated) DEVICE — THE STERILE CAMERA HANDLE COVER IS FOR USE WITH THE STERIS SURGICAL LIGHTING AND VISUALIZATION SYSTEMS.

## (undated) DEVICE — VESSEL SEALER EXTEND: Brand: ENDOWRIST

## (undated) DEVICE — GOWN,SIRUS,POLYRNF,BRTHSLV,XL,30/CS: Brand: MEDLINE

## (undated) DEVICE — GLV SURG SENSICARE PI ORTHO SZ7.5 LF STRL

## (undated) DEVICE — MAT PREVALON MOBL TRANSFR AIR W/PAD REPROC 39X81IN

## (undated) DEVICE — LN SMPL CO2 SHTRM SD STREAM W/M LUER

## (undated) DEVICE — CANNULA SEAL

## (undated) DEVICE — BITEBLOCK ENDO W/STRAP 60F A/ LF DISP

## (undated) DEVICE — TUBING, SUCTION, 1/4" X 10', STRAIGHT: Brand: MEDLINE

## (undated) DEVICE — APL DUPLOSPRAYER MIS 40CM

## (undated) DEVICE — KT ORCA ORCAPOD DISP STRL

## (undated) DEVICE — LAPAROSCOPIC GAS CONDITIONING DEVICE.: Brand: INSUFLOW

## (undated) DEVICE — VISIGI 3D®  CALIBRATION SYSTEM  SIZE 40FR STD W/ BULB: Brand: BOEHRINGER® VISIGI 3D™ SLEEVE GASTRECTOMY CALIBRATION SYSTEM, SIZE 40FR W/BULB

## (undated) DEVICE — LAPAROVUE VISIBILITY SYSTEM LAPAROSCOPIC SOLUTIONS: Brand: LAPAROVUE

## (undated) DEVICE — ADAPT CLN BIOGUARD AIR/H2O DISP

## (undated) DEVICE — 9165 UNIVERSAL PATIENT PLATE: Brand: 3M™

## (undated) DEVICE — SUT ETHLN 2/0 PS 18IN 585H

## (undated) DEVICE — THE STERILE LIGHT HANDLE COVER IS USED WITH STERIS SURGICAL LIGHTING AND VISUALIZATION SYSTEMS.

## (undated) DEVICE — NEEDLE, QUINCKE, 20GX3.5": Brand: MEDLINE

## (undated) DEVICE — ENDOPATH PNEUMONEEDLE INSUFFLATION NEEDLES WITH LUER LOCK CONNECTORS 120MM: Brand: ENDOPATH

## (undated) DEVICE — ESOPHAGEAL BALLOON DILATATION CATHETER: Brand: CRE FIXED WIRE

## (undated) DEVICE — FRCP BX RADJAW4 NDL 2.8 240CM LG OG BX40

## (undated) DEVICE — SOL IRRIG NACL 1000ML

## (undated) DEVICE — BITEBLOCK OMNI BLOC

## (undated) DEVICE — STAPLER 60: Brand: SUREFORM

## (undated) DEVICE — APPL HEMOS FOR DELIVERY FLOSEAL

## (undated) DEVICE — GAUZE,SPONGE,4"X4",16PLY,XRAY,STRL,LF: Brand: MEDLINE